# Patient Record
Sex: FEMALE | Race: WHITE | NOT HISPANIC OR LATINO | Employment: OTHER | URBAN - METROPOLITAN AREA
[De-identification: names, ages, dates, MRNs, and addresses within clinical notes are randomized per-mention and may not be internally consistent; named-entity substitution may affect disease eponyms.]

---

## 2017-08-22 ENCOUNTER — HOSPITAL ENCOUNTER (OUTPATIENT)
Dept: RADIOLOGY | Facility: MEDICAL CENTER | Age: 67
End: 2017-08-22
Attending: INTERNAL MEDICINE
Payer: MEDICARE

## 2017-08-22 DIAGNOSIS — I77.89 ENLARGEMENT OF ABDOMINAL AORTA (HCC): ICD-10-CM

## 2017-08-22 PROCEDURE — 76700 US EXAM ABDOM COMPLETE: CPT

## 2017-08-25 ENCOUNTER — HOSPITAL ENCOUNTER (OUTPATIENT)
Dept: LAB | Facility: MEDICAL CENTER | Age: 67
End: 2017-08-25
Attending: INTERNAL MEDICINE
Payer: MEDICARE

## 2017-08-25 LAB
ALBUMIN SERPL BCP-MCNC: 4.1 G/DL (ref 3.2–4.9)
ALBUMIN/GLOB SERPL: 1.4 G/DL
ALP SERPL-CCNC: 78 U/L (ref 30–99)
ALT SERPL-CCNC: 24 U/L (ref 2–50)
ANION GAP SERPL CALC-SCNC: 8 MMOL/L (ref 0–11.9)
AST SERPL-CCNC: 26 U/L (ref 12–45)
BASOPHILS # BLD AUTO: 1.5 % (ref 0–1.8)
BASOPHILS # BLD: 0.1 K/UL (ref 0–0.12)
BILIRUB SERPL-MCNC: 0.7 MG/DL (ref 0.1–1.5)
BUN SERPL-MCNC: 21 MG/DL (ref 8–22)
CALCIUM SERPL-MCNC: 9.9 MG/DL (ref 8.5–10.5)
CHLORIDE SERPL-SCNC: 105 MMOL/L (ref 96–112)
CHOLEST SERPL-MCNC: 197 MG/DL (ref 100–199)
CO2 SERPL-SCNC: 24 MMOL/L (ref 20–33)
CREAT SERPL-MCNC: 0.79 MG/DL (ref 0.5–1.4)
EOSINOPHIL # BLD AUTO: 0.11 K/UL (ref 0–0.51)
EOSINOPHIL NFR BLD: 1.6 % (ref 0–6.9)
ERYTHROCYTE [DISTWIDTH] IN BLOOD BY AUTOMATED COUNT: 46.6 FL (ref 35.9–50)
FASTING STATUS PATIENT QL REPORTED: NORMAL
GFR SERPL CREATININE-BSD FRML MDRD: >60 ML/MIN/1.73 M 2
GLOBULIN SER CALC-MCNC: 3 G/DL (ref 1.9–3.5)
GLUCOSE SERPL-MCNC: 93 MG/DL (ref 65–99)
HCT VFR BLD AUTO: 40.8 % (ref 37–47)
HDLC SERPL-MCNC: 74 MG/DL
HGB BLD-MCNC: 13.7 G/DL (ref 12–16)
IMM GRANULOCYTES # BLD AUTO: 0.02 K/UL (ref 0–0.11)
IMM GRANULOCYTES NFR BLD AUTO: 0.3 % (ref 0–0.9)
LDLC SERPL CALC-MCNC: 111 MG/DL
LYMPHOCYTES # BLD AUTO: 2.34 K/UL (ref 1–4.8)
LYMPHOCYTES NFR BLD: 34.9 % (ref 22–41)
MCH RBC QN AUTO: 31.8 PG (ref 27–33)
MCHC RBC AUTO-ENTMCNC: 33.6 G/DL (ref 33.6–35)
MCV RBC AUTO: 94.7 FL (ref 81.4–97.8)
MONOCYTES # BLD AUTO: 0.66 K/UL (ref 0–0.85)
MONOCYTES NFR BLD AUTO: 9.8 % (ref 0–13.4)
NEUTROPHILS # BLD AUTO: 3.48 K/UL (ref 2–7.15)
NEUTROPHILS NFR BLD: 51.9 % (ref 44–72)
NRBC # BLD AUTO: 0 K/UL
NRBC BLD AUTO-RTO: 0 /100 WBC
PLATELET # BLD AUTO: 225 K/UL (ref 164–446)
PMV BLD AUTO: 9.8 FL (ref 9–12.9)
POTASSIUM SERPL-SCNC: 4.3 MMOL/L (ref 3.6–5.5)
PROT SERPL-MCNC: 7.1 G/DL (ref 6–8.2)
RBC # BLD AUTO: 4.31 M/UL (ref 4.2–5.4)
SODIUM SERPL-SCNC: 137 MMOL/L (ref 135–145)
TRIGL SERPL-MCNC: 60 MG/DL (ref 0–149)
WBC # BLD AUTO: 6.7 K/UL (ref 4.8–10.8)

## 2017-08-25 PROCEDURE — 85025 COMPLETE CBC W/AUTO DIFF WBC: CPT

## 2017-08-25 PROCEDURE — 80061 LIPID PANEL: CPT | Mod: GA

## 2017-08-25 PROCEDURE — 36415 COLL VENOUS BLD VENIPUNCTURE: CPT

## 2017-08-25 PROCEDURE — 80053 COMPREHEN METABOLIC PANEL: CPT

## 2017-10-10 ENCOUNTER — HOSPITAL ENCOUNTER (OUTPATIENT)
Dept: RADIOLOGY | Facility: MEDICAL CENTER | Age: 67
End: 2017-10-10
Attending: SURGERY
Payer: MEDICARE

## 2017-10-10 DIAGNOSIS — I71.40 ABDOMINAL AORTIC ANEURYSM WITHOUT RUPTURE (HCC): ICD-10-CM

## 2017-10-10 PROCEDURE — 75635 CT ANGIO ABDOMINAL ARTERIES: CPT

## 2017-10-10 PROCEDURE — 700117 HCHG RX CONTRAST REV CODE 255: Performed by: SURGERY

## 2017-10-10 RX ADMIN — IOHEXOL 100 ML: 350 INJECTION, SOLUTION INTRAVENOUS at 12:30

## 2017-11-17 ENCOUNTER — OFFICE VISIT (OUTPATIENT)
Dept: CARDIOLOGY | Facility: MEDICAL CENTER | Age: 67
End: 2017-11-17
Payer: MEDICARE

## 2017-11-17 ENCOUNTER — TELEPHONE (OUTPATIENT)
Dept: CARDIOLOGY | Facility: MEDICAL CENTER | Age: 67
End: 2017-11-17

## 2017-11-17 VITALS
DIASTOLIC BLOOD PRESSURE: 90 MMHG | WEIGHT: 124 LBS | HEART RATE: 70 BPM | SYSTOLIC BLOOD PRESSURE: 150 MMHG | OXYGEN SATURATION: 99 %

## 2017-11-17 DIAGNOSIS — E78.5 DYSLIPIDEMIA: ICD-10-CM

## 2017-11-17 DIAGNOSIS — Z72.0 TOBACCO ABUSE: ICD-10-CM

## 2017-11-17 DIAGNOSIS — I71.60 THORACOABDOMINAL AORTIC ANEURYSM (TAAA) WITHOUT RUPTURE (HCC): ICD-10-CM

## 2017-11-17 DIAGNOSIS — I10 ESSENTIAL HYPERTENSION, BENIGN: ICD-10-CM

## 2017-11-17 LAB — EKG IMPRESSION: NORMAL

## 2017-11-17 PROCEDURE — 93000 ELECTROCARDIOGRAM COMPLETE: CPT | Performed by: INTERNAL MEDICINE

## 2017-11-17 PROCEDURE — 99204 OFFICE O/P NEW MOD 45 MIN: CPT | Performed by: INTERNAL MEDICINE

## 2017-11-17 RX ORDER — LISINOPRIL 5 MG/1
5 TABLET ORAL 2 TIMES DAILY
Qty: 60 TAB | Refills: 11 | Status: SHIPPED | OUTPATIENT
Start: 2017-11-17 | End: 2018-04-05

## 2017-11-17 RX ORDER — ATORVASTATIN CALCIUM 20 MG/1
20 TABLET, FILM COATED ORAL DAILY
Qty: 30 TAB | Refills: 11 | Status: SHIPPED | OUTPATIENT
Start: 2017-11-17 | End: 2018-04-18 | Stop reason: SDUPTHER

## 2017-11-17 RX ORDER — METOPROLOL SUCCINATE 25 MG/1
25 TABLET, EXTENDED RELEASE ORAL 2 TIMES DAILY
COMMUNITY
End: 2018-04-18 | Stop reason: SDUPTHER

## 2017-11-17 RX ORDER — NICOTINE 21 MG/24HR
1 PATCH, TRANSDERMAL 24 HOURS TRANSDERMAL EVERY 24 HOURS
COMMUNITY
End: 2018-04-05

## 2017-11-17 ASSESSMENT — ENCOUNTER SYMPTOMS
CHILLS: 0
WEAKNESS: 0
PSYCHIATRIC NEGATIVE: 1
CARDIOVASCULAR NEGATIVE: 1
EYE REDNESS: 1
FOCAL WEAKNESS: 0
BLURRED VISION: 0
BRUISES/BLEEDS EASILY: 0
SHORTNESS OF BREATH: 0
VOMITING: 0
CONSTITUTIONAL NEGATIVE: 1
NAUSEA: 0
HEADACHES: 0
DIZZINESS: 0
DEPRESSION: 0
COUGH: 0
WEIGHT LOSS: 0
ABDOMINAL PAIN: 0
CONSTIPATION: 1
MUSCULOSKELETAL NEGATIVE: 1
NERVOUS/ANXIOUS: 0
MYALGIAS: 0
RESPIRATORY NEGATIVE: 1
PALPITATIONS: 0
DOUBLE VISION: 0
CLAUDICATION: 0
FEVER: 0
NEUROLOGICAL NEGATIVE: 1

## 2017-11-17 NOTE — TELEPHONE ENCOUNTER
Patient is scheduled on 11-29-17 for a C w/poss with  at [facility]. No meds to stop. Patient was instructed to hold nicotine patch for 12hrs per Dr. Gonzales's request. Patient was told to check in at 6:00am for a 7:30 procedure. H&P was done on 11-17-17 by Dr. Gonzales. Pre-admit is scheduled on 11-28-17 at 1:45.

## 2017-11-17 NOTE — TELEPHONE ENCOUNTER
----- Message from TAVO Boyer sent at 11/16/2017  2:23 PM PST -----  Regarding: RE: KUN patient  Perfect. Thanks. I let him know that cath schedule is tight right now. SC  ----- Message -----  From: Yenny Maxwell  Sent: 11/16/2017   2:15 PM  To: TAVO Boyer  Subject: RE: KUN patient                                   I sent it to the schedulers to see if they can get patient squeezed into his schedule tomorrow 11-17-17.   ----- Message -----  From: TAVO Boyer  Sent: 11/16/2017   2:07 PM  To: Yenny Maxwell  Subject: KUN patient                                       Needs cath prior to endovascular surgery with Femi. Pre-op eval. Can we fit patient into JI schedule tomorrow for consult and then schedule cath after. Thanks, SC    ----- Message -----  From: Yenny Maxwell  Sent: 11/16/2017   1:42 PM  To: TAVO Boyer        ----- Message -----  From: Fredy Gonzales M.D.  Sent: 11/16/2017  12:34 PM  To: Yenny Maxwell    Please schedule consult and cardiac catheterization with me as requested by Nirav Mota.    Thanks.  KUN.

## 2017-11-17 NOTE — PROGRESS NOTES
Subjective:   Brie Olivier is a 67 y.o. female who presents today as a consult from Nirav Mota for cardiac catheterization for thoracoabdominal aortic aneurysm.    Thank you for allowing me to evaluate Mrs. Olivier, who as you know is a 67 year old female with thoracoabdominal aortic aneurysm. She is pending surgical repair in [place]. She is clinically doing well. She denies chest pain, shortness of breath, palpitations, nausea/vomiting or diaphoresis.    Past Medical History:   Diagnosis Date   • Thoracoabdominal aortic aneurysm (CMS-HCC)      History reviewed. No pertinent surgical history.  Family History   Problem Relation Age of Onset   • Heart Disease Neg Hx      History   Smoking Status   • Current Some Day Smoker   • Packs/day: 0.25   • Years: 40.00   • Types: Cigarettes   Smokeless Tobacco   • Never Used     Comment: 1-2 a day      No Known Allergies      Medications reviewed.    Outpatient Encounter Prescriptions as of 11/17/2017   Medication Sig Dispense Refill   • Psyllium (METAMUCIL FIBER PO) Take  by mouth.     • Loratadine (CLARITIN PO) Take  by mouth.     • metoprolol SR (TOPROL XL) 25 MG TABLET SR 24 HR Take 25 mg by mouth every day. 1/2 tab @@ bedtime     • nicotine (NICODERM CQ) 21 MG/24HR PATCH 24 HR Apply 1 Patch to skin as directed every 24 hours.       No facility-administered encounter medications on file as of 11/17/2017.      Review of Systems   Constitutional: Negative.  Negative for chills, fever, malaise/fatigue and weight loss.   HENT: Negative.  Negative for hearing loss.    Eyes: Positive for redness. Negative for blurred vision and double vision.   Respiratory: Negative.  Negative for cough and shortness of breath.    Cardiovascular: Negative.  Negative for chest pain, palpitations, claudication and leg swelling.   Gastrointestinal: Positive for constipation. Negative for abdominal pain, nausea and vomiting.   Genitourinary: Negative.  Negative for dysuria and urgency.    Musculoskeletal: Negative.  Negative for joint pain and myalgias.   Skin: Negative.  Negative for itching and rash.   Neurological: Negative.  Negative for dizziness, focal weakness, weakness and headaches.   Endo/Heme/Allergies: Positive for environmental allergies. Does not bruise/bleed easily.   Psychiatric/Behavioral: Negative.  Negative for depression. The patient is not nervous/anxious.         Objective:   /90   Pulse 70   Wt 56.2 kg (124 lb)   SpO2 99%     Physical Exam   Constitutional: She is oriented to person, place, and time. She appears well-developed and well-nourished.   HENT:   Head: Normocephalic and atraumatic.   Eyes: Conjunctivae are normal. Pupils are equal, round, and reactive to light.   Neck: Normal range of motion. Neck supple.   Cardiovascular: Normal rate and regular rhythm.    Pulmonary/Chest: Effort normal and breath sounds normal.   Abdominal: Soft. Bowel sounds are normal.   Musculoskeletal: Normal range of motion. She exhibits no edema.   Neurological: She is alert and oriented to person, place, and time.   Skin: Skin is warm and dry.   Psychiatric: She has a normal mood and affect.     CARDIAC STUDIES/PROCEDURES:    CTA OF CHEST (10/10/17)  1.  Aneurysmal dilatation of the thoracic aorta diffusely with maximum diameter just above the diaphragmatic hiatus measuring 4.6 x 4.4 cm.  2.  Aneurysmal dilatation of the ascending thoracic aorta with maximum AP diameter 3.6 cm.  3.  Aneurysmal dilatation of the abdominal aorta to the level of the infrarenal portion with maximum transverse dimensions of 4.6 x 4.4 cm.  4.  Patent mesenteric and renal arteries.  5.  No evidence of aortic dissection.    ECHOCARDIOGRAM CONCLUSIONS at [facility] (11/11/17)  Echocardiogram showing ejection fraction of 66%, mild tricuspid regurgitation.    EKG was ordered for thoracoabdominal aortic aneurysm, performed on (11/17/17) and reviewed: EKG shows sinus rhythm.    Laboratory results of  (08/25/17) were reviewed. Cholesterol profile of 197/60/74/111 noted.    ULTRASOUND ABDOMEN (08/22/17)  1.  Abdominal aortic aneurysm measuring up to 6.2 x 6.6 cm  2.  Moderate extrahepatic biliary dilatation without evidence of intrahepatic biliary dilatation. This may be due to an anatomic variation (ductal plate malformation) however a distal obstructing lesion is not excluded.    Assessment:     1. Thoracoabdominal aortic aneurysm (TAAA) without rupture (CMS-[facility])  EKG   2. Essential hypertension, benign     3. Dyslipidemia     4. Tobacco abuse       Medical Decision Making:  Today's Assessment / Status / Plan:     1. Thoracoabdominal aortic aneurysm: She is pending surgery. We will perform cardiac catheterization as requested. She understands the risks and benefits and agrees with plan.  2. Hypertension: Blood pressure has been high. We will start lisinopril 5 mg BID and reassess the blood pressure.  3. Hyperlipidemia: We will start statin therapy with atorvastatin 20 mg QHS.  4. Chronic tobacco use: Smoking cessation recommended.  5. Additional information: She is a close friend of Jack Valdivia.    The risks, benefits, and alternatives to coronary angiography with IV sedation were discussed in great detail. Specific risks mentioned include bleeding, infection, kidney damage, allergic reaction, cardiac perforation with possible tamponade requiring francheska-cardiocentesis or possible open heart surgery. In addition, we discussed that 10% of patients will experience small to moderate bruising at the side of the arterial puncture. Lastly the risks of heart attack, stroke, and death were discussed; the risks of major complications such as heart attack or stroke caused by the angiogram is less than 1%; the risk of death is approximately 1 in 1000. The patient verbalized understanding of these potential complications and wishes to proceed with this procedure.    We will follow up in two months.    Thank you for this  consult.    CC Spencer Lord

## 2017-11-28 DIAGNOSIS — Z01.812 PRE-PROCEDURAL LABORATORY EXAMINATION: ICD-10-CM

## 2017-11-28 DIAGNOSIS — Z01.810 PRE-OPERATIVE CARDIOVASCULAR EXAMINATION: ICD-10-CM

## 2017-11-28 LAB
ANION GAP SERPL CALC-SCNC: 8 MMOL/L (ref 0–11.9)
BUN SERPL-MCNC: 26 MG/DL (ref 8–22)
CALCIUM SERPL-MCNC: 9.7 MG/DL (ref 8.5–10.5)
CHLORIDE SERPL-SCNC: 105 MMOL/L (ref 96–112)
CO2 SERPL-SCNC: 26 MMOL/L (ref 20–33)
CREAT SERPL-MCNC: 0.65 MG/DL (ref 0.5–1.4)
EKG IMPRESSION: NORMAL
ERYTHROCYTE [DISTWIDTH] IN BLOOD BY AUTOMATED COUNT: 47.7 FL (ref 35.9–50)
GFR SERPL CREATININE-BSD FRML MDRD: >60 ML/MIN/1.73 M 2
GLUCOSE SERPL-MCNC: 90 MG/DL (ref 65–99)
HCT VFR BLD AUTO: 39.2 % (ref 37–47)
HGB BLD-MCNC: 13 G/DL (ref 12–16)
INR PPP: 1.03 (ref 0.87–1.13)
MCH RBC QN AUTO: 32 PG (ref 27–33)
MCHC RBC AUTO-ENTMCNC: 33.2 G/DL (ref 33.6–35)
MCV RBC AUTO: 96.6 FL (ref 81.4–97.8)
PLATELET # BLD AUTO: 244 K/UL (ref 164–446)
PMV BLD AUTO: 9.4 FL (ref 9–12.9)
POTASSIUM SERPL-SCNC: 4.1 MMOL/L (ref 3.6–5.5)
PROTHROMBIN TIME: 13.2 SEC (ref 12–14.6)
RBC # BLD AUTO: 4.06 M/UL (ref 4.2–5.4)
SODIUM SERPL-SCNC: 139 MMOL/L (ref 135–145)
WBC # BLD AUTO: 6.6 K/UL (ref 4.8–10.8)

## 2017-11-28 PROCEDURE — 93005 ELECTROCARDIOGRAM TRACING: CPT

## 2017-11-28 PROCEDURE — 36415 COLL VENOUS BLD VENIPUNCTURE: CPT

## 2017-11-28 PROCEDURE — 85610 PROTHROMBIN TIME: CPT

## 2017-11-28 PROCEDURE — 85027 COMPLETE CBC AUTOMATED: CPT

## 2017-11-28 PROCEDURE — 80048 BASIC METABOLIC PNL TOTAL CA: CPT

## 2017-11-28 PROCEDURE — 93010 ELECTROCARDIOGRAM REPORT: CPT | Performed by: INTERNAL MEDICINE

## 2017-11-29 ENCOUNTER — HOSPITAL ENCOUNTER (OUTPATIENT)
Facility: MEDICAL CENTER | Age: 67
End: 2017-11-29
Attending: INTERNAL MEDICINE | Admitting: INTERNAL MEDICINE
Payer: MEDICARE

## 2017-11-29 VITALS
DIASTOLIC BLOOD PRESSURE: 74 MMHG | SYSTOLIC BLOOD PRESSURE: 120 MMHG | HEART RATE: 54 BPM | HEIGHT: 64 IN | BODY MASS INDEX: 20.89 KG/M2 | OXYGEN SATURATION: 99 % | RESPIRATION RATE: 16 BRPM | TEMPERATURE: 97.2 F | WEIGHT: 122.36 LBS

## 2017-11-29 PROCEDURE — G0278 ILIAC ART ANGIO,CARDIAC CATH: HCPCS

## 2017-11-29 PROCEDURE — 700111 HCHG RX REV CODE 636 W/ 250 OVERRIDE (IP)

## 2017-11-29 PROCEDURE — 160002 HCHG RECOVERY MINUTES (STAT)

## 2017-11-29 PROCEDURE — 700101 HCHG RX REV CODE 250

## 2017-11-29 PROCEDURE — 93458 L HRT ARTERY/VENTRICLE ANGIO: CPT

## 2017-11-29 PROCEDURE — 99152 MOD SED SAME PHYS/QHP 5/>YRS: CPT

## 2017-11-29 PROCEDURE — C1894 INTRO/SHEATH, NON-LASER: HCPCS

## 2017-11-29 PROCEDURE — 93567 NJX CAR CTH SPRVLV AORTGRPHY: CPT

## 2017-11-29 PROCEDURE — C1769 GUIDE WIRE: HCPCS

## 2017-11-29 PROCEDURE — 307093 HCHG TR BAND RADIAL

## 2017-11-29 PROCEDURE — 99153 MOD SED SAME PHYS/QHP EA: CPT

## 2017-11-29 PROCEDURE — 360979 HCHG DIAGNOSTIC CATH

## 2017-11-29 RX ORDER — SODIUM CHLORIDE 9 MG/ML
INJECTION, SOLUTION INTRAVENOUS
Status: DISCONTINUED | OUTPATIENT
Start: 2017-11-29 | End: 2017-11-29

## 2017-11-29 RX ORDER — HEPARIN SODIUM,PORCINE 1000/ML
VIAL (ML) INJECTION
Status: DISPENSED
Start: 2017-11-29 | End: 2017-11-29

## 2017-11-29 RX ORDER — MIDAZOLAM HYDROCHLORIDE 1 MG/ML
INJECTION INTRAMUSCULAR; INTRAVENOUS
Status: DISPENSED
Start: 2017-11-29 | End: 2017-11-29

## 2017-11-29 RX ORDER — DIPHENHYDRAMINE HYDROCHLORIDE 50 MG/ML
25 INJECTION INTRAMUSCULAR; INTRAVENOUS EVERY 6 HOURS PRN
Status: DISCONTINUED | OUTPATIENT
Start: 2017-11-29 | End: 2017-11-29 | Stop reason: HOSPADM

## 2017-11-29 RX ORDER — VERAPAMIL HYDROCHLORIDE 2.5 MG/ML
INJECTION, SOLUTION INTRAVENOUS
Status: DISPENSED
Start: 2017-11-29 | End: 2017-11-29

## 2017-11-29 RX ORDER — SCOLOPAMINE TRANSDERMAL SYSTEM 1 MG/1
1 PATCH, EXTENDED RELEASE TRANSDERMAL
Status: DISCONTINUED | OUTPATIENT
Start: 2017-11-29 | End: 2017-11-29 | Stop reason: HOSPADM

## 2017-11-29 RX ORDER — HALOPERIDOL 5 MG/ML
1 INJECTION INTRAMUSCULAR EVERY 6 HOURS PRN
Status: DISCONTINUED | OUTPATIENT
Start: 2017-11-29 | End: 2017-11-29 | Stop reason: HOSPADM

## 2017-11-29 RX ORDER — DEXAMETHASONE SODIUM PHOSPHATE 4 MG/ML
4 INJECTION, SOLUTION INTRA-ARTICULAR; INTRALESIONAL; INTRAMUSCULAR; INTRAVENOUS; SOFT TISSUE
Status: DISCONTINUED | OUTPATIENT
Start: 2017-11-29 | End: 2017-11-29 | Stop reason: HOSPADM

## 2017-11-29 RX ORDER — SODIUM CHLORIDE 9 MG/ML
INJECTION, SOLUTION INTRAVENOUS CONTINUOUS
Status: ACTIVE | OUTPATIENT
Start: 2017-11-29 | End: 2017-11-29

## 2017-11-29 RX ORDER — ONDANSETRON 2 MG/ML
4 INJECTION INTRAMUSCULAR; INTRAVENOUS EVERY 4 HOURS PRN
Status: DISCONTINUED | OUTPATIENT
Start: 2017-11-29 | End: 2017-11-29 | Stop reason: HOSPADM

## 2017-11-29 RX ORDER — LIDOCAINE HYDROCHLORIDE 20 MG/ML
INJECTION, SOLUTION INFILTRATION; PERINEURAL
Status: DISPENSED
Start: 2017-11-29 | End: 2017-11-29

## 2017-11-29 RX ADMIN — SODIUM CHLORIDE: 9 INJECTION, SOLUTION INTRAVENOUS at 06:54

## 2017-11-29 ASSESSMENT — PAIN SCALES - GENERAL
PAINLEVEL_OUTOF10: 0

## 2017-11-29 NOTE — OR NURSING
0858Pt report given from cath lab RN, pt transferred over on a gurney, pt placed on monitor, B/P every 15 minutes per MD order. Right radial TR band in place, 13ml of air in band per RN report. Site clean, dry and soft. Pt was given water and a snack. Pt instructed to limit right wrist movement.     1005 2 ml of air was removed from TR band no S/S of bleeding    1020 3 ml of air was removed from TR band no S/S of bleeding    1035 3 ml of air was removed from TR band no S/S of bleeding    1050 3 ml of air was removed from TR band no S/S of bleeding    1105 3 ml of air was removed from TR band no S/S of bleeding    1204 pt given D/C instructions, pt verbalized understanding. Pt was given information post angiogram care. Pt going home with family in wheelchair.

## 2017-11-29 NOTE — PROGRESS NOTES
Medications pulled under patient's csn # from 11/28/17.  All medications were used including 2mg versed and 100mg fentanyl.  Unable to chart meds.  See Cath Report.

## 2017-11-29 NOTE — DISCHARGE INSTRUCTIONS
ACTIVITY: Rest and take it easy for the first 24 hours.  A responsible adult is recommended to remain with you during that time.  It is normal to feel sleepy.  We encourage you to not do anything that requires balance, judgment or coordination.    MILD FLU-LIKE SYMPTOMS ARE NORMAL. YOU MAY EXPERIENCE GENERALIZED MUSCLE ACHES, THROAT IRRITATION, HEADACHE AND/OR SOME NAUSEA.    FOR 24 HOURS DO NOT:  Drive, operate machinery or run household appliances.  Drink beer or alcoholic beverages.   Make important decisions or sign legal documents.    SPECIAL INSTRUCTIONS: keep incision dry for 24 hours    DIET: To avoid nausea, slowly advance diet as tolerated, avoiding spicy or greasy foods for the first day.  Add more substantial food to your diet according to your physician's instructions.  Babies can be fed formula or breast milk as soon as they are hungry.  INCREASE FLUIDS AND FIBER TO AVOID CONSTIPATION.    SURGICAL DRESSING/BATHING: do not shower for 24 hours, may shower to matt 11/30/2017 after 0900am    FOLLOW-UP APPOINTMENT:  A follow-up appointment should be arranged with your doctor Janet at [phone]; call to schedule.    You should CALL YOUR PHYSICIAN if you develop:  Fever greater than 101 degrees F.  Pain not relieved by medication, or persistent nausea or vomiting.  Excessive bleeding (blood soaking through dressing) or unexpected drainage from the wound.  Extreme redness or swelling around the incision site, drainage of pus or foul smelling drainage.  Inability to urinate or empty your bladder within 8 hours.  Problems with breathing or chest pain.    You should call 911 if you develop problems with breathing or chest pain.  If you are unable to contact your doctor or surgical center, you should go to the nearest emergency room or urgent care center.  Physician's telephone #: [phone]    If any questions arise, call your doctor.  If your doctor is not available, please feel free to call the  Surgical Center at [phone].  The Center is open Monday through Friday from 7AM to 7PM.  You can also call the HEALTH HOTLINE open 24 hours/day, 7 days/week and speak to a nurse at [phone], or toll free at [phone].    A registered nurse may call you a few days after your surgery to see how you are doing after your procedure.    MEDICATIONS: Resume taking daily medication.  Take prescribed pain medication with food.  If no medication is prescribed, you may take non-aspirin pain medication if needed.  PAIN MEDICATION CAN BE VERY CONSTIPATING.  Take a stool softener or laxative such as senokot, pericolace, or milk of magnesia if needed.    If your physician has prescribed pain medication that includes Acetaminophen (Tylenol), do not take additional Acetaminophen (Tylenol) while taking the prescribed medication.    Depression / Suicide Risk    As you are discharged from this [facility] facility, it is important to learn how to keep safe from harming yourself.    Recognize the warning signs:  · Abrupt changes in personality, positive or negative- including increase in energy   · Giving away possessions  · Change in eating patterns- significant weight changes-  positive or negative  · Change in sleeping patterns- unable to sleep or sleeping all the time   · Unwillingness or inability to communicate  · Depression  · Unusual sadness, discouragement and loneliness  · Talk of wanting to die  · Neglect of personal appearance   · Rebelliousness- reckless behavior  · Withdrawal from people/activities they love  · Confusion- inability to concentrate     If you or a loved one observes any of these behaviors or has concerns about self-harm, here's what you can do:  · Talk about it- your feelings and reasons for harming yourself  · Remove any means that you might use to hurt yourself (examples: pills, rope, extension cords, firearm)  · Get professional help from the community (Mental Health, Substance Abuse,  psychological counseling)  · Do not be alone:Call your Safe Contact- someone whom you trust who will be there for you.  · Call your local CRISIS HOTLINE 240-0200 or 099-731-4471  · Call your local Children's Mobile Crisis Response Team [place] (443) 793-7503 or www.[employer]iki[employer].ru  · Call the toll free National Suicide Prevention Hotlines   · National Suicide Prevention Lifeline 102-542-SXSI (8373)  · [place] [facility] Line Network 800-SUICIDE (308-4704)    Radial Catherization Discharge Instructions      · Do not subject hand/arm to any forceful movements for 24 hours    i.e. supporting weight when rising from the chair or bed.   · Do not drive a car for 24 hours  · You may remove the dressing tomorrow  · You may shower on the day following your procedure.  Do not take a tub bath or submerge the puncture site in water for 3 days following the procedure.  · No Lifting more than 3-5 pounds with affected wrist for 5 days  · Follow up with Dr. Gonzales  2-4 weeks.  · Increase fluids for 2 days post procedure.  · Continue all previous medications unless otherwise instructed.    If bleeding should occur following discharge:  · Sit down and apply firm pressure to site with your fingers for 10 minutes  · If the bleeding stops, continue to sit quietly, keeping your wrist straight for 2 hours.  Notify physician as soon as possible ( [phone])  · If bleeding does not stop after 10 minutes, or if there is a large amount of bleeding or spurting, call 911 immediately.  Do not drive yourself to the hospital.

## 2017-12-13 ENCOUNTER — HOSPITAL ENCOUNTER (OUTPATIENT)
Dept: OTHER | Facility: MEDICAL CENTER | Age: 67
End: 2017-12-13
Attending: INTERNAL MEDICINE
Payer: MEDICARE

## 2017-12-13 PROCEDURE — 94729 DIFFUSING CAPACITY: CPT | Mod: 26 | Performed by: INTERNAL MEDICINE

## 2017-12-13 PROCEDURE — 94060 EVALUATION OF WHEEZING: CPT

## 2017-12-13 PROCEDURE — 94729 DIFFUSING CAPACITY: CPT

## 2017-12-13 PROCEDURE — 94726 PLETHYSMOGRAPHY LUNG VOLUMES: CPT | Mod: 26 | Performed by: INTERNAL MEDICINE

## 2017-12-13 PROCEDURE — 94060 EVALUATION OF WHEEZING: CPT | Mod: 26 | Performed by: INTERNAL MEDICINE

## 2017-12-13 PROCEDURE — 94726 PLETHYSMOGRAPHY LUNG VOLUMES: CPT

## 2017-12-13 ASSESSMENT — PULMONARY FUNCTION TESTS
FEV1_PERCENT_CHANGE: 0
FVC_PERCENT_PREDICTED: 100
FEV1_PERCENT_PREDICTED: 87
FEV1/FVC: 68.14
FEV1_PERCENT_CHANGE: 0
FVC: 2.95
FEV1/FVC_PERCENT_PREDICTED: 87
FEV1/FVC: 68
FEV1: 2.01
FVC_PERCENT_PREDICTED: 100
FEV1/FVC_PERCENT_PREDICTED: 87
FEV1/FVC_PERCENT_CHANGE: 0
FVC: 2.95
FEV1/FVC_PERCENT_PREDICTED: 78
FVC_PREDICTED: 2.94
FEV1_PREDICTED: 2.29
FEV1: 2.01
FEV1_PERCENT_PREDICTED: 87

## 2017-12-17 NOTE — PROCEDURES
DATE OF TEST:  12/13/2017    TECHNICIAN NOTE:  The patient had good effort and cooperation.  The results of   the test meet the ATS standards for acceptability and repeatability.    SPIROMETRY:  1.  FVC was 2.95 liters, 100% of predicted.  2.  FEV1 was 2.01 liters, 87% of predicted.  3.  FEV1/FVC ratio was 68%.  4.  There was no significant response to bronchodilators.    LUNG VOLUMES:  1.  Total lung capacity was 5.75 liters, 113% of predicted.  2.  Residual volume was 3.06 liters, 143% of predicted.    Diffusion capacity was normal at 93% of predicted.    IMPRESSION:  The patient has mild obstructive ventilatory defect with FEV1 of   87% of predicted and FEV1/FVC ratio mildly decreased to 68%.  The patient also   has moderate air trapping.  These findings are consistent with the patient's   listed diagnosis of COPD.  Clinical correlation is required.       ____________________________________     MD ESTER Wilson / REANNA    DD:  12/17/2017 12:15:37  DT:  12/17/2017 13:04:13    D#:  [id number]  Job#:  [id number]

## 2018-02-27 ENCOUNTER — TELEPHONE (OUTPATIENT)
Dept: CARDIOLOGY | Facility: MEDICAL CENTER | Age: 68
End: 2018-02-27

## 2018-02-27 NOTE — TELEPHONE ENCOUNTER
Dr. Galvez's office calling for surgical clearance   Received: Today   Message Contents   BAILEE Hirsch/Eleanor Galvez's office is calling about surgical clearance. Please call [phone].        TAAA Repair with Dr. Garrison Galvez, in [facility] scheduled for 3/6/18    Office is requesting surgical clearance. Per MAR no blood thinners.      When complete, Fax # [phone] Attn: Yessenia

## 2018-02-27 NOTE — LETTER
PROCEDURE/SURGERY CLEARANCE FORM      Encounter Date: 2/27/2018    Patient: Brie Olivier  YOB: 1950    CARDIOLOGIST:  Fredy Gonzales MD/ Harleen ANGULO    REFERRING DOCTOR:  Garrison Galvez MD        The above patient is cleared to have the following procedure/surgery: TAAA Repair                                            Additional comments: This patient only saw our cardiology group for left heart cath procedure, which was clean. This procedure was done for clearance for AAA repair, which was supposed to be evaluated by vascular surgeon, Dr. Kahn but it looks [place] now [place]. Nonetheless, she is cleared for procedure.                    Provider Signature   Harleen ANGULO

## 2018-02-28 NOTE — TELEPHONE ENCOUNTER
Doctor's office returning call   Received: Today   Message Contents   BAILEE Gong/Frank Douglass at Dr Galvez's office at [phone] is returning Eleanor's call from yesterday.      Called Evonne and no answer. LM with details on Evonne's voicemail. Also faxing clearance letter to [phone]

## 2018-02-28 NOTE — TELEPHONE ENCOUNTER
VENECIA Boyer.   You 15 hours ago (4:56 PM)     Did JI write you about this patient? She is only seeing cardiology for [facility] procedure, which was clean. This procedure was done for clearance for AAA repair, which was supposed to be evaluated by Femi but it looks like she is now [place] of [place]. Nonetheless, she is cleared for procedure. SC (Routing comment)

## 2018-03-28 ENCOUNTER — TELEPHONE (OUTPATIENT)
Dept: CARDIOLOGY | Facility: MEDICAL CENTER | Age: 68
End: 2018-03-28

## 2018-03-28 NOTE — TELEPHONE ENCOUNTER
Pt's  brought in records from pts recent hospitalization from 3/07 to 03/20 at [facility].  Discharge summary recommends cardiology f/u in 1 week. Pt scheduled for 4/5 to see JI. Records sent to scan.

## 2018-04-05 ENCOUNTER — OFFICE VISIT (OUTPATIENT)
Dept: CARDIOLOGY | Facility: MEDICAL CENTER | Age: 68
End: 2018-04-05
Payer: MEDICARE

## 2018-04-05 VITALS
DIASTOLIC BLOOD PRESSURE: 50 MMHG | BODY MASS INDEX: 19.46 KG/M2 | WEIGHT: 114 LBS | SYSTOLIC BLOOD PRESSURE: 104 MMHG | OXYGEN SATURATION: 98 % | HEIGHT: 64 IN | HEART RATE: 65 BPM

## 2018-04-05 DIAGNOSIS — E78.5 DYSLIPIDEMIA: ICD-10-CM

## 2018-04-05 DIAGNOSIS — I10 ESSENTIAL HYPERTENSION, BENIGN: ICD-10-CM

## 2018-04-05 DIAGNOSIS — I71.60 THORACOABDOMINAL AORTIC ANEURYSM (TAAA) WITHOUT RUPTURE (HCC): ICD-10-CM

## 2018-04-05 PROCEDURE — 99213 OFFICE O/P EST LOW 20 MIN: CPT | Performed by: INTERNAL MEDICINE

## 2018-04-05 RX ORDER — AMLODIPINE BESYLATE 5 MG/1
5 TABLET ORAL 2 TIMES DAILY
COMMUNITY
End: 2019-07-02

## 2018-04-05 RX ORDER — CYCLOBENZAPRINE HCL 5 MG
5-10 TABLET ORAL 3 TIMES DAILY PRN
COMMUNITY
End: 2019-07-02

## 2018-04-05 ASSESSMENT — ENCOUNTER SYMPTOMS
LOSS OF CONSCIOUSNESS: 0
ORTHOPNEA: 0
PALPITATIONS: 0
INSOMNIA: 0
BLURRED VISION: 0
FEVER: 0
ABDOMINAL PAIN: 0
CHILLS: 0
SHORTNESS OF BREATH: 0
DIZZINESS: 0
MYALGIAS: 0
PND: 0

## 2018-04-05 NOTE — PROGRESS NOTES
Chief Complaint   Patient presents with   • Follow-Up     Thoracoabdominal aortic aneurysm       Subjective:   Brie Olivier is a 67 y.o. female who presents today for hospital follow up.    Since the discharge from [facility] on 11/29/17 status post cardiac catheterization, she has been doing well. She admits to discomfort from her large incision site of her left chest which is healing well and fatigue. She denies chest pain, shortness of breath, palpitations, nausea/vomiting or diaphoresis.    Past Medical History:   Diagnosis Date   • Pneumonia 2015   • Thoracoabdominal aortic aneurysm (CMS-HCC)      History reviewed. No pertinent surgical history.  Family History   Problem Relation Age of Onset   • Heart Disease Neg Hx      Social History     Social History   • Marital status:      Spouse name: N/A   • Number of children: N/A   • Years of education: N/A     Occupational History   • Not on file.     Social History Main Topics   • Smoking status: Former Smoker     Packs/day: 0.25     Years: 40.00     Types: Cigarettes     Quit date: 3/3/2018   • Smokeless tobacco: Never Used      Comment: 1-2 a day    • Alcohol use 4.2 oz/week     7 Glasses of wine per week      Comment: 1 per day   • Drug use: No   • Sexual activity: Not on file     Other Topics Concern   • Not on file     Social History Narrative   • No narrative on file     No Known Allergies     Medications reviewed.    Outpatient Encounter Prescriptions as of 4/5/2018   Medication Sig Dispense Refill   • amLODIPine (NORVASC) 5 MG Tab Take 5 mg by mouth 2 Times a Day.     • aspirin 81 MG tablet Take 81 mg by mouth every day.     • cyclobenzaprine (FLEXERIL) 5 MG tablet Take 5-10 mg by mouth 3 times a day as needed.     • Melatonin 5 MG Cap Take  by mouth.     • Psyllium (METAMUCIL FIBER PO) Take  by mouth.     • metoprolol SR (TOPROL XL) 25 MG TABLET SR 24 HR Take 25 mg by mouth 2 Times a Day.     • atorvastatin (LIPITOR) 20 MG Tab  "Take 1 Tab by mouth every day. 30 Tab 11   • [DISCONTINUED] Loratadine (CLARITIN PO) Take  by mouth.     • [DISCONTINUED] nicotine (NICODERM CQ) 21 MG/24HR PATCH 24 HR Apply 1 Patch to skin as directed every 24 hours.     • [DISCONTINUED] lisinopril (PRINIVIL) 5 MG Tab Take 1 Tab by mouth 2 times a day. 60 Tab 11     No facility-administered encounter medications on file as of 4/5/2018.      Review of Systems   Constitutional: Positive for malaise/fatigue. Negative for chills and fever.   HENT: Negative for congestion.    Eyes: Negative for blurred vision.   Respiratory: Negative for shortness of breath.    Cardiovascular: Negative for chest pain, palpitations, orthopnea, leg swelling and PND.   Gastrointestinal: Negative for abdominal pain.   Genitourinary: Negative for dysuria.   Musculoskeletal: Negative for joint pain and myalgias.        Chest wall incisional discomfort.   Skin: Negative for rash.   Neurological: Negative for dizziness and loss of consciousness.   Psychiatric/Behavioral: The patient does not have insomnia.         Objective:   /50   Pulse 65   Ht 1.626 m (5' 4\")   Wt 51.7 kg (114 lb)   SpO2 98%   BMI 19.57 kg/m²     Physical Exam   Constitutional: She is oriented to person, place, and time. She appears well-developed and well-nourished.   HENT:   Head: Normocephalic and atraumatic.   Eyes: Conjunctivae are normal. Pupils are equal, round, and reactive to light.   Neck: Normal range of motion. Neck supple.   Cardiovascular: Normal rate and regular rhythm.    Pulmonary/Chest: Effort normal and breath sounds normal.   Abdominal: Soft. Bowel sounds are normal.   Musculoskeletal: Normal range of motion.   Neurological: She is alert and oriented to person, place, and time.   Skin: Skin is warm and dry.   Psychiatric: She has a normal mood and affect.        CARDIAC STUDIES/PROCEDURES:    CARDIAC CATHETERIZATION CONCLUSIONS (11/29/17)  1.  No angiographic evidence of coronary artery " disease.  2.  Normal left ventricular systolic function with ejection fraction of 65%.  3.  Mildly elevated left ventricular end-diastolic pressure.  4.  Moderately enlarged ascending aorta.  5.  Large descending aortic aneurysm originating just beyond the aortic arch   and continuing into the abdominal aorta.     CTA OF CHEST (10/10/17)  1.  Aneurysmal dilatation of the thoracic aorta diffusely with maximum diameter just above the diaphragmatic hiatus measuring 4.6 x 4.4 cm.  2.  Aneurysmal dilatation of the ascending thoracic aorta with maximum AP diameter 3.6 cm.  3.  Aneurysmal dilatation of the abdominal aorta to the level of the infrarenal portion with maximum transverse dimensions of 4.6 x 4.4 cm.  4.  Patent mesenteric and renal arteries.  5.  No evidence of aortic dissection.     ECHOCARDIOGRAM CONCLUSIONS at [facility] (11/11/17)  Echocardiogram showing ejection fraction of 66%, mild tricuspid regurgitation.     EKG performed on (11/28/17) was reviewed: EKG shows sinus rhythm with left ventricular hypertrophy.  EKG performed on (11/17/17) EKG shows sinus rhythm.     Laboratory results of (08/25/17) were reviewed. Cholesterol profile of 197/60/74/111 noted.    PULMONARY FUNCTION INTERPRETATION (12/13/17)  IMPRESSION:  The patient has mild obstructive ventilatory defect with FEV1 of   87% of predicted and FEV1/FVC ratio mildly decreased to 68%.  The patient also   has moderate air trapping.  These findings are consistent with the patient's   listed diagnosis of COPD.  Clinical correlation is required.      ULTRASOUND ABDOMEN (08/22/17)  1.  Abdominal aortic aneurysm measuring up to 6.2 x 6.6 cm  2.  Moderate extrahepatic biliary dilatation without evidence of intrahepatic biliary dilatation. This may be due to an anatomic variation (ductal plate malformation) however a distal obstructing lesion is not excluded.     Assessment:     1. Thoracoabdominal aortic aneurysm (TAAA) without rupture (CMS-[facility])      2. Essential hypertension, benign     3. Dyslipidemia       Medical Decision Making:  Today's Assessment / Status / Plan:     1. Thoracoabdominal aortic aneurysm with surgical repair [facility] in [place] (03/07/18): She is clinically doing well.  2. Hypertension: Blood pressure has been low.  We will hold her amlodipine and reassess.  3. Hyperlipidemia: She is doing well on statin therapy without myalgia symptoms. We will repeat labs including fasting lipid profile.  4. Chronic tobacco use: Smoking cessation recommended.  5. Additional information: She is a close friend of Jack Valdivia.    We will follow up the patient in one year.    CC Nirav Mota and Spencer Lord

## 2018-04-05 NOTE — LETTER
[facility]   [address], [address]  [place] [address]  Phone: [phone]  Fax: [phone]              Brie Olivier  1950    Encounter Date: 4/5/2018    Fredy Gonzales M.D.          PROGRESS NOTE:  Chief Complaint   Patient presents with   • Follow-Up     Thoracoabdominal aortic aneurysm       Subjective:   Brie Olivier is a 67 y.o. female who presents today for hospital follow up.    Since the discharge from [facility] on 11/29/17 status post cardiac catheterization, she has been doing well.    Past Medical History:   Diagnosis Date   • Pneumonia 2015   • Thoracoabdominal aortic aneurysm (CMS-HCC)      History reviewed. No pertinent surgical history.  Family History   Problem Relation Age of Onset   • Heart Disease Neg Hx      Social History     Social History   • Marital status:      Spouse name: N/A   • Number of children: N/A   • Years of education: N/A     Occupational History   • Not on file.     Social History Main Topics   • Smoking status: Former Smoker     Packs/day: 0.25     Years: 40.00     Types: Cigarettes     Quit date: 3/3/2018   • Smokeless tobacco: Never Used      Comment: 1-2 a day    • Alcohol use 4.2 oz/week     7 Glasses of wine per week      Comment: 1 per day   • Drug use: No   • Sexual activity: Not on file     Other Topics Concern   • Not on file     Social History Narrative   • No narrative on file     No Known Allergies     Medications reviewed.    Outpatient Encounter Prescriptions as of 4/5/2018   Medication Sig Dispense Refill   • amLODIPine (NORVASC) 5 MG Tab Take 5 mg by mouth 2 Times a Day.     • aspirin 81 MG tablet Take 81 mg by mouth every day.     • cyclobenzaprine (FLEXERIL) 5 MG tablet Take 5-10 mg by mouth 3 times a day as needed.     • Melatonin 5 MG Cap Take  by mouth.     • Psyllium (METAMUCIL FIBER PO) Take  by mouth.     • metoprolol SR (TOPROL XL) 25 MG TABLET SR 24 HR  "Take 25 mg by mouth 2 Times a Day.     • atorvastatin (LIPITOR) 20 MG Tab Take 1 Tab by mouth every day. 30 Tab 11   • [DISCONTINUED] Loratadine (CLARITIN PO) Take  by mouth.     • [DISCONTINUED] nicotine (NICODERM CQ) 21 MG/24HR PATCH 24 HR Apply 1 Patch to skin as directed every 24 hours.     • [DISCONTINUED] lisinopril (PRINIVIL) 5 MG Tab Take 1 Tab by mouth 2 times a day. 60 Tab 11     No facility-administered encounter medications on file as of 4/5/2018.      Review of Systems   Constitutional: Negative for chills and fever.   HENT: Negative for congestion.    Eyes: Negative for blurred vision.   Respiratory: Negative for shortness of breath.    Cardiovascular: Negative for chest pain, palpitations, orthopnea, leg swelling and PND.   Gastrointestinal: Negative for abdominal pain.   Genitourinary: Negative for dysuria.   Musculoskeletal: Negative for joint pain and myalgias.   Skin: Negative for rash.   Neurological: Negative for dizziness and loss of consciousness.   Psychiatric/Behavioral: The patient does not have insomnia.         Objective:   /50   Pulse 65   Ht 1.626 m (5' 4\")   Wt 51.7 kg (114 lb)   SpO2 98%   BMI 19.57 kg/m²      Physical Exam   Constitutional: She is oriented to person, place, and time. She appears well-developed and well-nourished.   HENT:   Head: Normocephalic and atraumatic.   Eyes: Conjunctivae are normal. Pupils are equal, round, and reactive to light.   Neck: Normal range of motion. Neck supple.   Cardiovascular: Normal rate and regular rhythm.    Pulmonary/Chest: Effort normal and breath sounds normal.   Abdominal: Soft. Bowel sounds are normal.   Musculoskeletal: Normal range of motion.   Neurological: She is alert and oriented to person, place, and time.   Skin: Skin is warm and dry.   Psychiatric: She has a normal mood and affect.        CARDIAC STUDIES/PROCEDURES:    CARDIAC CATHETERIZATION CONCLUSIONS (11/29/17)  1.  No angiographic evidence of coronary artery " disease.  2.  Normal left ventricular systolic function with ejection fraction of 65%.  3.  Mildly elevated left ventricular end-diastolic pressure.  4.  Moderately enlarged ascending aorta.  5.  Large descending aortic aneurysm originating just beyond the aortic arch   and continuing into the abdominal aorta.     CTA OF CHEST (10/10/17)  1.  Aneurysmal dilatation of the thoracic aorta diffusely with maximum diameter just above the diaphragmatic hiatus measuring 4.6 x 4.4 cm.  2.  Aneurysmal dilatation of the ascending thoracic aorta with maximum AP diameter 3.6 cm.  3.  Aneurysmal dilatation of the abdominal aorta to the level of the infrarenal portion with maximum transverse dimensions of 4.6 x 4.4 cm.  4.  Patent mesenteric and renal arteries.  5.  No evidence of aortic dissection.     ECHOCARDIOGRAM CONCLUSIONS at [facility] (11/11/17)  Echocardiogram showing ejection fraction of 66%, mild tricuspid regurgitation.     EKG was ordered for thoracoabdominal aortic aneurysm, performed on (11/17/17) and reviewed: EKG shows sinus rhythm.     Laboratory results of (08/25/17) were reviewed. Cholesterol profile of 197/60/74/111 noted.     ULTRASOUND ABDOMEN (08/22/17)  1.  Abdominal aortic aneurysm measuring up to 6.2 x 6.6 cm  2.  Moderate extrahepatic biliary dilatation without evidence of intrahepatic biliary dilatation. This may be due to an anatomic variation (ductal plate malformation) however a distal obstructing lesion is not excluded.       Assessment:     1. 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 Essential hypertension, benign     3. Dyslipidemia         Medical Decision Making:  Today's Assessment / Status / Plan:     1. Thoracoabdominal aortic aneurysm: She is pending surgery. We will perform cardiac catheterization as requested. She understands the risks and benefits and agrees with plan.  2. Hypertension: Blood pressure has been high. We will start lisinopril 5 mg  BID and reassess the blood pressure.  3. Hyperlipidemia: We will start statin therapy with atorvastatin 20 mg QHS.  4. Chronic tobacco use: Smoking cessation recommended.  5. Additional information: She is a close friend of Jack Valdivia.    CC Nirav Mota and Spencer Lord Recipients

## 2018-04-13 ENCOUNTER — HOSPITAL ENCOUNTER (OUTPATIENT)
Dept: LAB | Facility: MEDICAL CENTER | Age: 68
End: 2018-04-13
Attending: INTERNAL MEDICINE
Payer: MEDICARE

## 2018-04-13 DIAGNOSIS — E78.5 DYSLIPIDEMIA: ICD-10-CM

## 2018-04-13 LAB
ALBUMIN SERPL BCP-MCNC: 3.5 G/DL (ref 3.2–4.9)
ALBUMIN/GLOB SERPL: 0.9 G/DL
ALP SERPL-CCNC: 83 U/L (ref 30–99)
ALT SERPL-CCNC: 14 U/L (ref 2–50)
ANION GAP SERPL CALC-SCNC: 7 MMOL/L (ref 0–11.9)
AST SERPL-CCNC: 16 U/L (ref 12–45)
BILIRUB SERPL-MCNC: 0.5 MG/DL (ref 0.1–1.5)
BUN SERPL-MCNC: 23 MG/DL (ref 8–22)
CALCIUM SERPL-MCNC: 9.3 MG/DL (ref 8.5–10.5)
CHLORIDE SERPL-SCNC: 107 MMOL/L (ref 96–112)
CHOLEST SERPL-MCNC: 129 MG/DL (ref 100–199)
CO2 SERPL-SCNC: 24 MMOL/L (ref 20–33)
CREAT SERPL-MCNC: 0.76 MG/DL (ref 0.5–1.4)
GLOBULIN SER CALC-MCNC: 3.7 G/DL (ref 1.9–3.5)
GLUCOSE SERPL-MCNC: 98 MG/DL (ref 65–99)
HDLC SERPL-MCNC: 33 MG/DL
LDLC SERPL CALC-MCNC: 79 MG/DL
POTASSIUM SERPL-SCNC: 4.5 MMOL/L (ref 3.6–5.5)
PROT SERPL-MCNC: 7.2 G/DL (ref 6–8.2)
SODIUM SERPL-SCNC: 138 MMOL/L (ref 135–145)
TRIGL SERPL-MCNC: 86 MG/DL (ref 0–149)

## 2018-04-13 PROCEDURE — 36415 COLL VENOUS BLD VENIPUNCTURE: CPT

## 2018-04-13 PROCEDURE — 80061 LIPID PANEL: CPT

## 2018-04-13 PROCEDURE — 80053 COMPREHEN METABOLIC PANEL: CPT

## 2018-04-16 ENCOUNTER — TELEPHONE (OUTPATIENT)
Dept: CARDIOLOGY | Facility: MEDICAL CENTER | Age: 68
End: 2018-04-16

## 2018-04-17 ENCOUNTER — HOSPITAL ENCOUNTER (OUTPATIENT)
Dept: RADIOLOGY | Facility: MEDICAL CENTER | Age: 68
End: 2018-04-17
Attending: SURGERY
Payer: MEDICARE

## 2018-04-17 DIAGNOSIS — I71.60 THORACOABDOMINAL AORTIC ANEURYSM (TAAA) WITHOUT RUPTURE (HCC): ICD-10-CM

## 2018-04-17 DIAGNOSIS — R09.02 HYPOXIA: ICD-10-CM

## 2018-04-17 PROCEDURE — 71275 CT ANGIOGRAPHY CHEST: CPT

## 2018-04-17 PROCEDURE — 700117 HCHG RX CONTRAST REV CODE 255: Performed by: SURGERY

## 2018-04-17 RX ADMIN — IOHEXOL 75 ML: 350 INJECTION, SOLUTION INTRAVENOUS at 18:10

## 2018-04-18 ENCOUNTER — TELEPHONE (OUTPATIENT)
Dept: CARDIOLOGY | Facility: MEDICAL CENTER | Age: 68
End: 2018-04-18

## 2018-04-18 DIAGNOSIS — I10 ESSENTIAL HYPERTENSION: ICD-10-CM

## 2018-04-18 DIAGNOSIS — E78.5 DYSLIPIDEMIA: ICD-10-CM

## 2018-04-18 RX ORDER — ATORVASTATIN CALCIUM 20 MG/1
20 TABLET, FILM COATED ORAL DAILY
Qty: 90 TAB | Refills: 3 | OUTPATIENT
Start: 2018-04-18 | End: 2019-04-28 | Stop reason: SDUPTHER

## 2018-04-18 RX ORDER — METOPROLOL SUCCINATE 25 MG/1
25 TABLET, EXTENDED RELEASE ORAL DAILY
Qty: 90 TAB | Refills: 3 | OUTPATIENT
Start: 2018-04-18 | End: 2018-04-18 | Stop reason: SDUPTHER

## 2018-04-18 RX ORDER — METOPROLOL SUCCINATE 25 MG/1
25 TABLET, EXTENDED RELEASE ORAL 2 TIMES DAILY
Qty: 180 TAB | Refills: 3 | OUTPATIENT
Start: 2018-04-18 | End: 2019-05-23 | Stop reason: SDUPTHER

## 2018-04-18 NOTE — TELEPHONE ENCOUNTER
Called pt to clarify metoprolol dosage for refill request. Pt confirms that she is taking 25mg metoprolol succinate, one tablet BID. Pt explains that she had surgery in [place] and this dosage was prescribed to her. She said Dr. Gonzales told her to continue this at her last appt 4/05/18. Prescription called into pharmacy.

## 2018-09-24 ENCOUNTER — HOSPITAL ENCOUNTER (OUTPATIENT)
Dept: LAB | Facility: MEDICAL CENTER | Age: 68
End: 2018-09-24
Attending: SURGERY
Payer: MEDICARE

## 2018-09-24 PROCEDURE — 36415 COLL VENOUS BLD VENIPUNCTURE: CPT

## 2018-09-24 PROCEDURE — 82565 ASSAY OF CREATININE: CPT

## 2018-09-24 PROCEDURE — 84520 ASSAY OF UREA NITROGEN: CPT

## 2018-09-25 LAB
BUN SERPL-MCNC: 28 MG/DL (ref 8–22)
CREAT SERPL-MCNC: 0.86 MG/DL (ref 0.5–1.4)

## 2018-09-28 ENCOUNTER — APPOINTMENT (OUTPATIENT)
Dept: RADIOLOGY | Facility: MEDICAL CENTER | Age: 68
End: 2018-09-28
Attending: SURGERY
Payer: MEDICARE

## 2018-09-28 DIAGNOSIS — I71.60 THORACOABDOMINAL AORTIC ANEURYSM (TAAA) WITHOUT RUPTURE (HCC): ICD-10-CM

## 2018-09-28 PROCEDURE — 71275 CT ANGIOGRAPHY CHEST: CPT

## 2018-09-28 PROCEDURE — 700117 HCHG RX CONTRAST REV CODE 255: Performed by: SURGERY

## 2018-09-28 RX ADMIN — IOHEXOL 100 ML: 350 INJECTION, SOLUTION INTRAVENOUS at 14:58

## 2019-04-28 DIAGNOSIS — E78.5 DYSLIPIDEMIA: ICD-10-CM

## 2019-04-29 RX ORDER — ATORVASTATIN CALCIUM 20 MG/1
TABLET, FILM COATED ORAL
Qty: 90 TAB | Refills: 0 | Status: SHIPPED | OUTPATIENT
Start: 2019-04-29 | End: 2019-07-02 | Stop reason: SDUPTHER

## 2019-05-23 DIAGNOSIS — I10 ESSENTIAL HYPERTENSION: ICD-10-CM

## 2019-05-23 RX ORDER — METOPROLOL SUCCINATE 25 MG/1
25 TABLET, EXTENDED RELEASE ORAL 2 TIMES DAILY
Qty: 180 TAB | Refills: 0 | Status: SHIPPED | OUTPATIENT
Start: 2019-05-23 | End: 2019-07-02 | Stop reason: SDUPTHER

## 2019-07-02 ENCOUNTER — OFFICE VISIT (OUTPATIENT)
Dept: CARDIOLOGY | Facility: MEDICAL CENTER | Age: 69
End: 2019-07-02
Payer: MEDICARE

## 2019-07-02 VITALS
WEIGHT: 131.28 LBS | BODY MASS INDEX: 22.41 KG/M2 | SYSTOLIC BLOOD PRESSURE: 130 MMHG | HEIGHT: 64 IN | OXYGEN SATURATION: 99 % | HEART RATE: 60 BPM | DIASTOLIC BLOOD PRESSURE: 88 MMHG

## 2019-07-02 DIAGNOSIS — Z72.0 TOBACCO ABUSE: ICD-10-CM

## 2019-07-02 DIAGNOSIS — E78.5 DYSLIPIDEMIA: ICD-10-CM

## 2019-07-02 DIAGNOSIS — I10 ESSENTIAL HYPERTENSION, BENIGN: ICD-10-CM

## 2019-07-02 DIAGNOSIS — I71.60 THORACOABDOMINAL AORTIC ANEURYSM (TAAA) WITHOUT RUPTURE (HCC): ICD-10-CM

## 2019-07-02 DIAGNOSIS — I10 ESSENTIAL HYPERTENSION: ICD-10-CM

## 2019-07-02 PROCEDURE — 99214 OFFICE O/P EST MOD 30 MIN: CPT | Performed by: NURSE PRACTITIONER

## 2019-07-02 RX ORDER — ATORVASTATIN CALCIUM 20 MG/1
20 TABLET, FILM COATED ORAL EVERY EVENING
Qty: 90 TAB | Refills: 3 | Status: SHIPPED | OUTPATIENT
Start: 2019-07-02 | End: 2020-06-30

## 2019-07-02 RX ORDER — METOPROLOL SUCCINATE 25 MG/1
25 TABLET, EXTENDED RELEASE ORAL 2 TIMES DAILY
Qty: 180 TAB | Refills: 3 | Status: SHIPPED | OUTPATIENT
Start: 2019-07-02 | End: 2020-07-27

## 2019-07-02 ASSESSMENT — ENCOUNTER SYMPTOMS
DIZZINESS: 0
PND: 0
PALPITATIONS: 0
ABDOMINAL PAIN: 0
CLAUDICATION: 0
SHORTNESS OF BREATH: 0
COUGH: 0
MYALGIAS: 0
ORTHOPNEA: 0
FEVER: 0

## 2019-07-02 NOTE — PATIENT INSTRUCTIONS
We will repeat lab work before the end of the year.    Move your cholesterol medication (atorvastatin 20 mg) to the evening.    Start an exercise regimen.    Keep an eye on your BP reading, minimum once a week. Call for any abnormalities.    Continue your good efforts in smoking cessation.    Follow up in one year with Dr. Gonzales.

## 2019-07-02 NOTE — LETTER
[facility]   [address]  [place] [address]  Phone: [phone]  Fax: [phone]              Brie Olivier  1950    Encounter Date: 7/2/2019    TAVO Boyer          PROGRESS NOTE:  Chief Complaint   Patient presents with   • Hypertension     PP of JI     Subjective:   Brie Olivier is a 68 y.o. female who presents today for yearly follow up for HLD and HTN in the setting of prior AAA open repair at [facility] in '18.    She is a patient of Dr. Gonzales in our office.    Hx of AAA repair in '18, HLD, HTN, and prior smoker.    She continues to use nicotine patch and occasional relapses with smoking cigarettes.     She has no concerns or complaints.    She follows with Dr. Kahn regularly for her AAA repair.    She has not been checking her BP as regularly at home.    Past Medical History:   Diagnosis Date   • AAA (abdominal aortic aneurysm) (HCC)    • Hyperlipidemia    • Hypertension    • Pneumonia 2015     Past Surgical History:   Procedure Laterality Date   • AAA WITH STENT GRAFT     • ZZZ CARDIAC CATH       Family History   Problem Relation Age of Onset   • Heart Disease Neg Hx      Social History     Social History   • Marital status:      Spouse name: N/A   • Number of children: N/A   • Years of education: N/A     Occupational History   • Not on file.     Social History Main Topics   • Smoking status: Former Smoker     Packs/day: 0.25     Years: 40.00     Types: Cigarettes     Quit date: 3/3/2018   • Smokeless tobacco: Never Used      Comment: 1-2 a day    • Alcohol use 4.2 oz/week     7 Glasses of wine per week      Comment: 1 per day   • Drug use: No   • Sexual activity: Not on file     Other Topics Concern   • Not on file     Social History Narrative   • No narrative on file     No Known Allergies  Outpatient Encounter Prescriptions as of 7/2/2019   Medication Sig Dispense Refill    "  • Docusate Calcium (STOOL SOFTENER PO) Take  by mouth.     • Loratadine (CLARITIN PO) Take  by mouth.     • metoprolol SR (TOPROL XL) 25 MG TABLET SR 24 HR Take 1 Tab by mouth 2 Times a Day. 180 Tab 3   • atorvastatin (LIPITOR) 20 MG Tab Take 1 Tab by mouth every evening. 90 Tab 3   • [DISCONTINUED] metoprolol SR (TOPROL XL) 25 MG TABLET SR 24 HR Take 1 Tab by mouth 2 Times a Day. 180 Tab 0   • [DISCONTINUED] atorvastatin (LIPITOR) 20 MG Tab TAKE 1 TABLET BY MOUTH EVERY DAY 90 Tab 0   • [DISCONTINUED] amLODIPine (NORVASC) 5 MG Tab Take 5 mg by mouth 2 Times a Day.     • [DISCONTINUED] aspirin 81 MG tablet Take 81 mg by mouth every day.     • [DISCONTINUED] cyclobenzaprine (FLEXERIL) 5 MG tablet Take 5-10 mg by mouth 3 times a day as needed.     • [DISCONTINUED] Melatonin 5 MG Cap Take  by mouth.     • [DISCONTINUED] Psyllium (METAMUCIL FIBER PO) Take  by mouth.       No facility-administered encounter medications on file as of 7/2/2019.      Review of Systems   Constitutional: Negative for fever and malaise/fatigue.   Respiratory: Negative for cough and shortness of breath.    Cardiovascular: Negative for chest pain, palpitations, orthopnea, claudication, leg swelling and PND.   Gastrointestinal: Negative for abdominal pain.   Musculoskeletal: Negative for myalgias.   Neurological: Negative for dizziness.        Objective:   /88 (BP Location: Right arm, Patient Position: Sitting, BP Cuff Size: Adult)   Pulse 60   Ht 1.626 m (5' 4\")   Wt 59.5 kg (131 lb 4.5 oz)   SpO2 99%   BMI 22.53 kg/m²      Physical Exam   Constitutional: She is oriented to person, place, and time. She appears well-developed and well-nourished.   HENT:   Head: Normocephalic and atraumatic.   Eyes: EOM are normal.   Neck: No JVD present.   Cardiovascular: Normal rate, regular rhythm, normal heart sounds and intact distal pulses.    Pulmonary/Chest: Effort normal and breath sounds normal.   Musculoskeletal: Normal range of motion. She " exhibits no edema.   Neurological: She is alert and oriented to person, place, and time.   Skin: Skin is warm and dry.   Nursing note and vitals reviewed.      Assessment:     1. Dyslipidemia  Lipid Profile    Comp Metabolic Panel    atorvastatin (LIPITOR) 20 MG Tab   2. Essential hypertension, benign  CBC WITHOUT DIFFERENTIAL   3. Thoracoabdominal aortic aneurysm (TAAA) without rupture (HCC)  CBC WITHOUT DIFFERENTIAL   4. Tobacco abuse     5. Essential hypertension  metoprolol SR (TOPROL XL) 25 MG TABLET SR 24 HR     Medical Decision Making:  Today's Assessment / Status / Plan:     1. AAA repair  -imaging and surveillance by Dr. Kahn  -keep BP controlled, smoking cessation    2. HTN  -good control on toprol 25 mg BID  -refilled one year    3. HLD  -statin  -LDL goal <70 with vascular history  -repeat lipid and cmp this year    4. Tobacco abuse  -intermittent smoking  -recommend cessation, no resources wanted at this time    FU in clinic in 12 months with JI with labs    Patient verbalizes understanding and agrees with the plan of care.     Collaborating MD: Susu HALE        No Recipients

## 2019-07-02 NOTE — PROGRESS NOTES
Chief Complaint   Patient presents with   • Hypertension     PP of JI     Subjective:   Brie Olivier is a 68 y.o. female who presents today for yearly follow up for HLD and HTN in the setting of prior AAA open repair at [facility] in '18.    She is a patient of Dr. Gonzales in our office.    Hx of AAA repair in '18, HLD, HTN, and prior smoker.    She continues to use nicotine patch and occasional relapses with smoking cigarettes.     She has no concerns or complaints.    She follows with Dr. Kahn regularly for her AAA repair.    She has not been checking her BP as regularly at home.    Past Medical History:   Diagnosis Date   • AAA (abdominal aortic aneurysm) (HCC)    • Hyperlipidemia    • Hypertension    • Pneumonia 2015     Past Surgical History:   Procedure Laterality Date   • AAA WITH STENT GRAFT     • ZZZ CARDIAC CATH       Family History   Problem Relation Age of Onset   • Heart Disease Neg Hx      Social History     Social History   • Marital status:      Spouse name: N/A   • Number of children: N/A   • Years of education: N/A     Occupational History   • Not on file.     Social History Main Topics   • Smoking status: Former Smoker     Packs/day: 0.25     Years: 40.00     Types: Cigarettes     Quit date: 3/3/2018   • Smokeless tobacco: Never Used      Comment: 1-2 a day    • Alcohol use 4.2 oz/week     7 Glasses of wine per week      Comment: 1 per day   • Drug use: No   • Sexual activity: Not on file     Other Topics Concern   • Not on file     Social History Narrative   • No narrative on file     No Known Allergies  Outpatient Encounter Prescriptions as of 7/2/2019   Medication Sig Dispense Refill   • Docusate Calcium (STOOL SOFTENER PO) Take  by mouth.     • Loratadine (CLARITIN PO) Take  by mouth.     • metoprolol SR (TOPROL XL) 25 MG TABLET SR 24 HR Take 1 Tab by mouth 2 Times a Day. 180 Tab 3   • atorvastatin (LIPITOR) 20 MG Tab Take 1 Tab by mouth every evening. 90 Tab  "3   • [DISCONTINUED] metoprolol SR (TOPROL XL) 25 MG TABLET SR 24 HR Take 1 Tab by mouth 2 Times a Day. 180 Tab 0   • [DISCONTINUED] atorvastatin (LIPITOR) 20 MG Tab TAKE 1 TABLET BY MOUTH EVERY DAY 90 Tab 0   • [DISCONTINUED] amLODIPine (NORVASC) 5 MG Tab Take 5 mg by mouth 2 Times a Day.     • [DISCONTINUED] aspirin 81 MG tablet Take 81 mg by mouth every day.     • [DISCONTINUED] cyclobenzaprine (FLEXERIL) 5 MG tablet Take 5-10 mg by mouth 3 times a day as needed.     • [DISCONTINUED] Melatonin 5 MG Cap Take  by mouth.     • [DISCONTINUED] Psyllium (METAMUCIL FIBER PO) Take  by mouth.       No facility-administered encounter medications on file as of 7/2/2019.      Review of Systems   Constitutional: Negative for fever and malaise/fatigue.   Respiratory: Negative for cough and shortness of breath.    Cardiovascular: Negative for chest pain, palpitations, orthopnea, claudication, leg swelling and PND.   Gastrointestinal: Negative for abdominal pain.   Musculoskeletal: Negative for myalgias.   Neurological: Negative for dizziness.        Objective:   /88 (BP Location: Right arm, Patient Position: Sitting, BP Cuff Size: Adult)   Pulse 60   Ht 1.626 m (5' 4\")   Wt 59.5 kg (131 lb 4.5 oz)   SpO2 99%   BMI 22.53 kg/m²     Physical Exam   Constitutional: She is oriented to person, place, and time. She appears well-developed and well-nourished.   HENT:   Head: Normocephalic and atraumatic.   Eyes: EOM are normal.   Neck: No JVD present.   Cardiovascular: Normal rate, regular rhythm, normal heart sounds and intact distal pulses.    Pulmonary/Chest: Effort normal and breath sounds normal.   Musculoskeletal: Normal range of motion. She exhibits no edema.   Neurological: She is alert and oriented to person, place, and time.   Skin: Skin is warm and dry.   Nursing note and vitals reviewed.      Assessment:     1. Dyslipidemia  Lipid Profile    Comp Metabolic Panel    atorvastatin (LIPITOR) 20 MG Tab   2. Essential " hypertension, benign  CBC WITHOUT DIFFERENTIAL   3. Thoracoabdominal aortic aneurysm (TAAA) without rupture (HCC)  CBC WITHOUT DIFFERENTIAL   4. Tobacco abuse     5. Essential hypertension  metoprolol SR (TOPROL XL) 25 MG TABLET SR 24 HR     Medical Decision Making:  Today's Assessment / Status / Plan:     1. AAA repair  -imaging and surveillance by Dr. Kahn  -keep BP controlled, smoking cessation    2. HTN  -good control on toprol 25 mg BID  -refilled one year    3. HLD  -statin  -LDL goal <70 with vascular history  -repeat lipid and cmp this year    4. Tobacco abuse  -intermittent smoking  -recommend cessation, no resources wanted at this time    FU in clinic in 12 months with JI with labs    Patient verbalizes understanding and agrees with the plan of care.     Collaborating MD: Susu HALE

## 2020-06-30 DIAGNOSIS — E78.5 DYSLIPIDEMIA: ICD-10-CM

## 2020-06-30 RX ORDER — ATORVASTATIN CALCIUM 20 MG/1
TABLET, FILM COATED ORAL
Qty: 90 TAB | Refills: 0 | Status: SHIPPED | OUTPATIENT
Start: 2020-06-30 | End: 2020-10-23

## 2020-07-26 DIAGNOSIS — I10 ESSENTIAL HYPERTENSION: ICD-10-CM

## 2020-07-27 RX ORDER — METOPROLOL SUCCINATE 25 MG/1
TABLET, EXTENDED RELEASE ORAL
Qty: 180 TAB | Refills: 0 | Status: SHIPPED | OUTPATIENT
Start: 2020-07-27 | End: 2020-10-20

## 2020-09-09 ENCOUNTER — HOSPITAL ENCOUNTER (OUTPATIENT)
Dept: RADIOLOGY | Facility: MEDICAL CENTER | Age: 70
End: 2020-09-09
Attending: SURGERY
Payer: MEDICARE

## 2020-09-09 ENCOUNTER — HOSPITAL ENCOUNTER (OUTPATIENT)
Dept: LAB | Facility: MEDICAL CENTER | Age: 70
End: 2020-09-09
Attending: SURGERY
Payer: MEDICARE

## 2020-09-09 DIAGNOSIS — I71.60 THORACOABDOMINAL AORTIC ANEURYSM (TAAA) WITHOUT RUPTURE (HCC): ICD-10-CM

## 2020-09-09 LAB
BUN SERPL-MCNC: 30 MG/DL (ref 8–22)
CREAT SERPL-MCNC: 0.85 MG/DL (ref 0.5–1.4)

## 2020-09-09 PROCEDURE — 36415 COLL VENOUS BLD VENIPUNCTURE: CPT

## 2020-09-09 PROCEDURE — 700117 HCHG RX CONTRAST REV CODE 255: Performed by: SURGERY

## 2020-09-09 PROCEDURE — 84520 ASSAY OF UREA NITROGEN: CPT

## 2020-09-09 PROCEDURE — 82565 ASSAY OF CREATININE: CPT

## 2020-09-09 PROCEDURE — 71275 CT ANGIOGRAPHY CHEST: CPT

## 2020-09-09 RX ADMIN — IOHEXOL 75 ML: 350 INJECTION, SOLUTION INTRAVENOUS at 09:41

## 2020-10-19 DIAGNOSIS — I10 ESSENTIAL HYPERTENSION: ICD-10-CM

## 2020-10-20 RX ORDER — METOPROLOL SUCCINATE 25 MG/1
TABLET, EXTENDED RELEASE ORAL
Qty: 180 TAB | Refills: 0 | Status: SHIPPED | OUTPATIENT
Start: 2020-10-20 | End: 2021-01-26 | Stop reason: SDUPTHER

## 2020-10-23 DIAGNOSIS — E78.5 DYSLIPIDEMIA: ICD-10-CM

## 2020-10-23 RX ORDER — ATORVASTATIN CALCIUM 20 MG/1
TABLET, FILM COATED ORAL
Qty: 90 TAB | Refills: 0 | Status: SHIPPED | OUTPATIENT
Start: 2020-10-23 | End: 2021-01-26 | Stop reason: SDUPTHER

## 2021-01-15 DIAGNOSIS — Z23 NEED FOR VACCINATION: ICD-10-CM

## 2021-01-26 ENCOUNTER — OFFICE VISIT (OUTPATIENT)
Dept: CARDIOLOGY | Facility: MEDICAL CENTER | Age: 71
End: 2021-01-26
Payer: MEDICARE

## 2021-01-26 VITALS
HEART RATE: 60 BPM | OXYGEN SATURATION: 99 % | RESPIRATION RATE: 12 BRPM | BODY MASS INDEX: 23.39 KG/M2 | WEIGHT: 132 LBS | HEIGHT: 63 IN | SYSTOLIC BLOOD PRESSURE: 132 MMHG | DIASTOLIC BLOOD PRESSURE: 80 MMHG

## 2021-01-26 DIAGNOSIS — Z72.0 TOBACCO ABUSE: ICD-10-CM

## 2021-01-26 DIAGNOSIS — I71.60 THORACOABDOMINAL AORTIC ANEURYSM (TAAA) WITHOUT RUPTURE (HCC): ICD-10-CM

## 2021-01-26 DIAGNOSIS — E78.5 DYSLIPIDEMIA: ICD-10-CM

## 2021-01-26 DIAGNOSIS — I10 ESSENTIAL HYPERTENSION, BENIGN: ICD-10-CM

## 2021-01-26 DIAGNOSIS — I10 ESSENTIAL HYPERTENSION: ICD-10-CM

## 2021-01-26 PROCEDURE — 99214 OFFICE O/P EST MOD 30 MIN: CPT | Mod: 25 | Performed by: INTERNAL MEDICINE

## 2021-01-26 PROCEDURE — 99406 BEHAV CHNG SMOKING 3-10 MIN: CPT | Performed by: INTERNAL MEDICINE

## 2021-01-26 RX ORDER — ATORVASTATIN CALCIUM 20 MG/1
20 TABLET, FILM COATED ORAL
Qty: 90 TAB | Refills: 3 | Status: SHIPPED | OUTPATIENT
Start: 2021-01-26 | End: 2021-03-05 | Stop reason: SDUPTHER

## 2021-01-26 RX ORDER — METOPROLOL SUCCINATE 25 MG/1
25 TABLET, EXTENDED RELEASE ORAL DAILY
Qty: 180 TAB | Refills: 3 | Status: SHIPPED | OUTPATIENT
Start: 2021-01-26 | End: 2022-05-13 | Stop reason: SDUPTHER

## 2021-01-26 ASSESSMENT — ENCOUNTER SYMPTOMS
WEIGHT LOSS: 0
WEAKNESS: 0
GASTROINTESTINAL NEGATIVE: 1
PSYCHIATRIC NEGATIVE: 1
VOMITING: 0
CARDIOVASCULAR NEGATIVE: 1
CONSTITUTIONAL NEGATIVE: 1
CHILLS: 0
MYALGIAS: 0
EYES NEGATIVE: 1
BRUISES/BLEEDS EASILY: 0
COUGH: 0
NERVOUS/ANXIOUS: 0
MUSCULOSKELETAL NEGATIVE: 1
FOCAL WEAKNESS: 0
CLAUDICATION: 0
RESPIRATORY NEGATIVE: 1
BLURRED VISION: 0
DIZZINESS: 0
PALPITATIONS: 0
SHORTNESS OF BREATH: 0
ABDOMINAL PAIN: 0
FEVER: 0
NEUROLOGICAL NEGATIVE: 1
DOUBLE VISION: 0
DEPRESSION: 0
HEADACHES: 0
NAUSEA: 0

## 2021-01-26 NOTE — PROGRESS NOTES
Chief Complaint   Patient presents with   • Hypertension     F/V Dx: Essential hypertension, benign       Subjective:   Brie Olivier is a 70 y.o. female who presents today for follow up of thoracoabdominal aortic aneurysm.    Since the patient's last visit on 18, she has been doing well clinically. She denies chest pain, shortness of breath, palpitations, nausea/vomiting or diaphoresis.    Past Medical History:   Diagnosis Date   • AAA (abdominal aortic aneurysm) (HCC)    • Hyperlipidemia    • Hypertension    • Pneumonia      Past Surgical History:   Procedure Laterality Date   • AAA WITH STENT GRAFT     • ZZZ CARDIAC CATH       Family History   Problem Relation Age of Onset   • Heart Disease Neg Hx      Social History     Socioeconomic History   • Marital status:      Spouse name: Not on file   • Number of children: Not on file   • Years of education: Not on file   • Highest education level: Not on file   Occupational History   • Not on file   Social Needs   • Financial resource strain: Not on file   • Food insecurity     Worry: Not on file     Inability: Not on file   • Transportation needs     Medical: Not on file     Non-medical: Not on file   Tobacco Use   • Smoking status: Former Smoker     Packs/day: 0.25     Years: 40.00     Pack years: 10.00     Types: Cigarettes     Quit date: 3/3/2018     Years since quittin.9   • Smokeless tobacco: Never Used   • Tobacco comment: 1-2 a day    Substance and Sexual Activity   • Alcohol use: Yes     Alcohol/week: 4.2 oz     Types: 7 Glasses of wine per week     Comment: 1 per day   • Drug use: No   • Sexual activity: Not on file   Lifestyle   • Physical activity     Days per week: Not on file     Minutes per session: Not on file   • Stress: Not on file   Relationships   • Social connections     Talks on phone: Not on file     Gets together: Not on file     Attends Restorationist service: Not on file     Active member of club or organization: Not  "on file     Attends meetings of clubs or organizations: Not on file     Relationship status: Not on file   • Intimate partner violence     Fear of current or ex partner: Not on file     Emotionally abused: Not on file     Physically abused: Not on file     Forced sexual activity: Not on file   Other Topics Concern   • Not on file   Social History Narrative   • Not on file     No Known Allergies  Outpatient Encounter Medications as of 1/26/2021   Medication Sig Dispense Refill   • NICOTINE TD Place  on the skin.     • atorvastatin (LIPITOR) 20 MG Tab TAKE 1 TABLET BY MOUTH EVERY DAY IN THE EVENING 90 Tab 0   • metoprolol SR (TOPROL XL) 25 MG TABLET SR 24 HR TAKE 1 TABLET BY MOUTH TWICE A  Tab 0   • Docusate Calcium (STOOL SOFTENER PO) Take  by mouth.     • Loratadine (CLARITIN PO) Take  by mouth.       No facility-administered encounter medications on file as of 1/26/2021.      Review of Systems   Constitutional: Negative.  Negative for chills, fever, malaise/fatigue and weight loss.   HENT: Negative.  Negative for hearing loss.    Eyes: Negative.  Negative for blurred vision and double vision.   Respiratory: Negative.  Negative for cough and shortness of breath.    Cardiovascular: Negative.  Negative for chest pain, palpitations, claudication and leg swelling.   Gastrointestinal: Negative.  Negative for abdominal pain, nausea and vomiting.   Genitourinary: Negative.  Negative for dysuria and urgency.   Musculoskeletal: Negative.  Negative for joint pain and myalgias.   Skin: Negative.  Negative for itching and rash.   Neurological: Negative.  Negative for dizziness, focal weakness, weakness and headaches.   Endo/Heme/Allergies: Negative.  Does not bruise/bleed easily.   Psychiatric/Behavioral: Negative.  Negative for depression. The patient is not nervous/anxious.         Objective:   /80 (BP Location: Left arm, Patient Position: Sitting, BP Cuff Size: Adult)   Pulse 60   Resp 12   Ht 1.6 m (5' 3\")  "  Wt 59.9 kg (132 lb)   SpO2 99%   BMI 23.38 kg/m²     Physical Exam   Constitutional: She is oriented to person, place, and time. She appears well-developed and well-nourished.   HENT:   Head: Normocephalic and atraumatic.   Eyes: EOM are normal.   Neck: No JVD present.   Cardiovascular: Normal rate, regular rhythm and normal heart sounds.   Pulmonary/Chest: Effort normal and breath sounds normal.   Abdominal: Soft. Bowel sounds are normal.   No hepatosplenomegaly.   Musculoskeletal: Normal range of motion.   Lymphadenopathy:     She has no cervical adenopathy.   Neurological: She is alert and oriented to person, place, and time.   Skin: Skin is warm and dry.   Psychiatric: She has a normal mood and affect.     CARDIAC STUDIES/PROCEDURES:     CARDIAC CATHETERIZATION CONCLUSIONS (11/29/17)  1.  No angiographic evidence of coronary artery disease.  2.  Normal left ventricular systolic function with ejection fraction of 65%.  3.  Mildly elevated left ventricular end-diastolic pressure.  4.  Moderately enlarged ascending aorta.  5.  Large descending aortic aneurysm originating just beyond the aortic arch   and continuing into the abdominal aorta.    CTA OF CHEST AND ABDOMEN (09/09/20)  1.  Interval enlargement of the ascending and descending thoracic aorta as described. New soft plaque/thrombus in the posterior descending thoracic aorta. Postoperative changes from prior surgical repair are unchanged.  2.  Markedly ectatic and beaded appearance of the celiac, superior mesenteric, and renal arteries. Findings are similar to the prior exam. 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  3.  COPD.  4.  Diverticulosis.  (study result reviewed)     CTA OF CHEST (10/10/17)  1.  Aneurysmal dilatation of the thoracic aorta diffusely with maximum diameter just above the diaphragmatic hiatus measuring 4.6 x 4.4 cm.  2.  Aneurysmal dilatation of the ascending thoracic aorta with maximum AP diameter 3.6 cm.  3.   Aneurysmal dilatation of the abdominal aorta to the level of the infrarenal portion with maximum transverse dimensions of 4.6 x 4.4 cm.  4.  Patent mesenteric and renal arteries.  5.  No evidence of aortic dissection.     ECHOCARDIOGRAM CONCLUSIONS at [facility] (11/11/17)  Echocardiogram showing ejection fraction of 66%, mild tricuspid regurgitation.     EKG performed on (11/28/17) EKG shows sinus rhythm with left ventricular hypertrophy.  EKG performed on (11/17/17) EKG shows sinus rhythm.     Laboratory results of (04/13/18) were reviewed. Cholesterol profile of 129/86/33/79 mg/dL noted.  Laboratory results of (08/25/17) Cholesterol profile of 197/60/74/111 noted.     PULMONARY FUNCTION INTERPRETATION (12/13/17)  IMPRESSION:  The patient has mild obstructive ventilatory defect with FEV1 of   87% of predicted and FEV1/FVC ratio mildly decreased to 68%.  The patient also   has moderate air trapping.  These findings are consistent with the patient's   listed diagnosis of COPD.  Clinical correlation is required.      ULTRASOUND ABDOMEN (08/22/17)  1.  Abdominal aortic aneurysm measuring up to 6.2 x 6.6 cm  2.  Moderate extrahepatic biliary dilatation without evidence of intrahepatic biliary dilatation. This may be due to an anatomic variation (ductal plate malformation) however a distal obstructing lesion is not excluded.    Assessment:     1. 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 Essential hypertension, benign     3. Dyslipidemia     4. Tobacco abuse       Medical Decision Making:  Today's Assessment / Status / Plan:     1. Thoracoabdominal aortic aneurysm (with surgical repair [facility] in [place] 03/07/18): There has been interval increase of her aneurysm per her last CT. We will refer to [facility]. We will repeat an echocardiogram.  2. Hypertension: Blood pressure is well controlled. We will continue with metoprolol.  3. Hyperlipidemia: She  is doing well on statin therapy without myalgia symptoms.  4. Chronic tobacco use: Smoking cessation recommended with discussions of health effects and plans for over 3 minutes.  5. Additional information: She is a close friend of Jack Valdivia.    We will follow up in three months.     CC Nirav Mota and Spencer Lord

## 2021-02-03 ENCOUNTER — TELEPHONE (OUTPATIENT)
Dept: VASCULAR LAB | Facility: MEDICAL CENTER | Age: 71
End: 2021-02-03

## 2021-02-09 ENCOUNTER — HOSPITAL ENCOUNTER (OUTPATIENT)
Dept: LAB | Facility: MEDICAL CENTER | Age: 71
End: 2021-02-09
Attending: INTERNAL MEDICINE
Payer: MEDICARE

## 2021-02-09 DIAGNOSIS — E78.5 DYSLIPIDEMIA: ICD-10-CM

## 2021-02-09 LAB
ALBUMIN SERPL BCP-MCNC: 4.3 G/DL (ref 3.2–4.9)
ALBUMIN/GLOB SERPL: 1.7 G/DL
ALP SERPL-CCNC: 72 U/L (ref 30–99)
ALT SERPL-CCNC: 17 U/L (ref 2–50)
ANION GAP SERPL CALC-SCNC: 11 MMOL/L (ref 7–16)
AST SERPL-CCNC: 22 U/L (ref 12–45)
BILIRUB SERPL-MCNC: 0.4 MG/DL (ref 0.1–1.5)
BUN SERPL-MCNC: 22 MG/DL (ref 8–22)
CALCIUM SERPL-MCNC: 9.4 MG/DL (ref 8.4–10.2)
CHLORIDE SERPL-SCNC: 107 MMOL/L (ref 96–112)
CHOLEST SERPL-MCNC: 162 MG/DL (ref 100–199)
CO2 SERPL-SCNC: 25 MMOL/L (ref 20–33)
CREAT SERPL-MCNC: 0.75 MG/DL (ref 0.5–1.4)
FASTING STATUS PATIENT QL REPORTED: NORMAL
GLOBULIN SER CALC-MCNC: 2.6 G/DL (ref 1.9–3.5)
GLUCOSE SERPL-MCNC: 108 MG/DL (ref 65–99)
HDLC SERPL-MCNC: 70 MG/DL
LDLC SERPL CALC-MCNC: 79 MG/DL
POTASSIUM SERPL-SCNC: 4.8 MMOL/L (ref 3.6–5.5)
PROT SERPL-MCNC: 6.9 G/DL (ref 6–8.2)
SODIUM SERPL-SCNC: 143 MMOL/L (ref 135–145)
TRIGL SERPL-MCNC: 65 MG/DL (ref 0–149)

## 2021-02-09 PROCEDURE — 80061 LIPID PANEL: CPT

## 2021-02-09 PROCEDURE — 80053 COMPREHEN METABOLIC PANEL: CPT

## 2021-02-09 PROCEDURE — 36415 COLL VENOUS BLD VENIPUNCTURE: CPT

## 2021-02-26 ENCOUNTER — HOSPITAL ENCOUNTER (OUTPATIENT)
Dept: CARDIOLOGY | Facility: MEDICAL CENTER | Age: 71
End: 2021-02-26
Attending: INTERNAL MEDICINE
Payer: MEDICARE

## 2021-02-26 DIAGNOSIS — I71.60 THORACOABDOMINAL AORTIC ANEURYSM (TAAA) WITHOUT RUPTURE (HCC): ICD-10-CM

## 2021-02-26 PROCEDURE — 93306 TTE W/DOPPLER COMPLETE: CPT

## 2021-03-01 ENCOUNTER — TELEPHONE (OUTPATIENT)
Dept: CARDIOLOGY | Facility: MEDICAL CENTER | Age: 71
End: 2021-03-01

## 2021-03-01 LAB
LV EJECT FRACT  99904: 65
LV EJECT FRACT MOD 2C 99903: 65.43
LV EJECT FRACT MOD 4C 99902: 76.24
LV EJECT FRACT MOD BP 99901: 70.74

## 2021-03-01 PROCEDURE — 93306 TTE W/DOPPLER COMPLETE: CPT | Mod: 26 | Performed by: INTERNAL MEDICINE

## 2021-03-01 NOTE — TELEPHONE ENCOUNTER
Message  Received: Today  Message Contents   SHAHANA Rader R.N.   Please call with unremarkable study, echocardiogram. Ascending aorta measures normal on this study, however, CT results we obtained are more accurate.     Thanks.  KUN   -------------------------------------------------------------  Notified patient of results. She states she has been evaluated and being followed by Dr. Kahn.

## 2021-03-05 DIAGNOSIS — E78.5 DYSLIPIDEMIA: ICD-10-CM

## 2021-03-05 DIAGNOSIS — I10 ESSENTIAL HYPERTENSION: ICD-10-CM

## 2021-03-05 RX ORDER — ATORVASTATIN CALCIUM 80 MG/1
80 TABLET, FILM COATED ORAL
Qty: 90 TABLET | Refills: 3 | Status: SHIPPED | OUTPATIENT
Start: 2021-03-05 | End: 2022-08-24

## 2021-03-06 NOTE — TELEPHONE ENCOUNTER
Returned patient's phone call, and she states she already spoke to someone regarding her recent Lipid Panel and increase in atorvastatin.

## 2021-03-06 NOTE — TELEPHONE ENCOUNTER
PT called and left a VM stating they are returning your call in regards to test results. They can be reached at [phone].    Thank you,  Mari BROCK

## 2021-04-09 ENCOUNTER — TELEPHONE (OUTPATIENT)
Dept: VASCULAR LAB | Facility: MEDICAL CENTER | Age: 71
End: 2021-04-09

## 2021-05-20 ENCOUNTER — HOSPITAL ENCOUNTER (OUTPATIENT)
Dept: LAB | Facility: MEDICAL CENTER | Age: 71
End: 2021-05-20
Attending: NURSE PRACTITIONER
Payer: MEDICARE

## 2021-05-20 DIAGNOSIS — E78.5 DYSLIPIDEMIA: ICD-10-CM

## 2021-05-20 DIAGNOSIS — I10 ESSENTIAL HYPERTENSION: ICD-10-CM

## 2021-05-20 LAB
ALBUMIN SERPL BCP-MCNC: 4.4 G/DL (ref 3.2–4.9)
ALBUMIN/GLOB SERPL: 1.5 G/DL
ALP SERPL-CCNC: 75 U/L (ref 30–99)
ALT SERPL-CCNC: 26 U/L (ref 2–50)
ANION GAP SERPL CALC-SCNC: 11 MMOL/L (ref 7–16)
AST SERPL-CCNC: 32 U/L (ref 12–45)
BILIRUB SERPL-MCNC: 0.4 MG/DL (ref 0.1–1.5)
BUN SERPL-MCNC: 31 MG/DL (ref 8–22)
CALCIUM SERPL-MCNC: 9.7 MG/DL (ref 8.4–10.2)
CHLORIDE SERPL-SCNC: 104 MMOL/L (ref 96–112)
CHOLEST SERPL-MCNC: 151 MG/DL (ref 100–199)
CO2 SERPL-SCNC: 24 MMOL/L (ref 20–33)
CREAT SERPL-MCNC: 0.95 MG/DL (ref 0.5–1.4)
FASTING STATUS PATIENT QL REPORTED: NORMAL
GLOBULIN SER CALC-MCNC: 2.9 G/DL (ref 1.9–3.5)
GLUCOSE SERPL-MCNC: 102 MG/DL (ref 65–99)
HDLC SERPL-MCNC: 69 MG/DL
LDLC SERPL CALC-MCNC: 70 MG/DL
POTASSIUM SERPL-SCNC: 4.7 MMOL/L (ref 3.6–5.5)
PROT SERPL-MCNC: 7.3 G/DL (ref 6–8.2)
SODIUM SERPL-SCNC: 139 MMOL/L (ref 135–145)
TRIGL SERPL-MCNC: 60 MG/DL (ref 0–149)

## 2021-05-20 PROCEDURE — 80061 LIPID PANEL: CPT

## 2021-05-20 PROCEDURE — 36415 COLL VENOUS BLD VENIPUNCTURE: CPT

## 2021-05-20 PROCEDURE — 80053 COMPREHEN METABOLIC PANEL: CPT

## 2021-08-10 ENCOUNTER — DOCUMENTATION (OUTPATIENT)
Dept: VASCULAR LAB | Facility: MEDICAL CENTER | Age: 71
End: 2021-08-10

## 2021-08-10 NOTE — PROGRESS NOTES
Called pt to schedule her for new pt. Pt was in the middle of smelting but told me to go ahead and schedule her for an appointment and that she would call me back to get the time and date.   I told her I would call her in about a week if I didn't hear from her.

## 2021-11-05 ENCOUNTER — DOCUMENTATION (OUTPATIENT)
Dept: VASCULAR LAB | Facility: MEDICAL CENTER | Age: 71
End: 2021-11-05

## 2021-11-05 NOTE — PROGRESS NOTES
Patient referred for evaluation and management in the vascular care clinic for TAA surveillance from Dr. Fredy Gonzales (cardiology).   Unfortunately patient missed appointment for initial visit in our office today.  We will be unable to take part in care until or unless patient makes an appointment for a face-to-face visit in our center  We will ask our  or medical assistant to call and reschedule     Pending further patient contact, we will defer all vascular care, including management of cardiovascular risk factors, to PCP and other members of the care team    Vascular Medicine Clinic   [facility]   [phone]

## 2021-11-16 ENCOUNTER — HOSPITAL ENCOUNTER (OUTPATIENT)
Dept: LAB | Facility: MEDICAL CENTER | Age: 71
End: 2021-11-16
Attending: FAMILY MEDICINE
Payer: MEDICARE

## 2021-11-16 LAB
ALBUMIN SERPL BCP-MCNC: 4.2 G/DL (ref 3.2–4.9)
ALBUMIN/GLOB SERPL: 1.7 G/DL
ALP SERPL-CCNC: 72 U/L (ref 30–99)
ALT SERPL-CCNC: 16 U/L (ref 2–50)
ANION GAP SERPL CALC-SCNC: 10 MMOL/L (ref 7–16)
AST SERPL-CCNC: 20 U/L (ref 12–45)
BASOPHILS # BLD AUTO: 0.8 % (ref 0–1.8)
BASOPHILS # BLD: 0.04 K/UL (ref 0–0.12)
BILIRUB SERPL-MCNC: 0.5 MG/DL (ref 0.1–1.5)
BUN SERPL-MCNC: 20 MG/DL (ref 8–22)
CALCIUM SERPL-MCNC: 9.4 MG/DL (ref 8.4–10.2)
CHLORIDE SERPL-SCNC: 107 MMOL/L (ref 96–112)
CHOLEST SERPL-MCNC: 172 MG/DL (ref 100–199)
CO2 SERPL-SCNC: 23 MMOL/L (ref 20–33)
CREAT SERPL-MCNC: 0.81 MG/DL (ref 0.5–1.4)
CREAT UR-MCNC: 56.61 MG/DL
EOSINOPHIL # BLD AUTO: 0.14 K/UL (ref 0–0.51)
EOSINOPHIL NFR BLD: 2.8 % (ref 0–6.9)
ERYTHROCYTE [DISTWIDTH] IN BLOOD BY AUTOMATED COUNT: 48.2 FL (ref 35.9–50)
EST. AVERAGE GLUCOSE BLD GHB EST-MCNC: 103 MG/DL
FASTING STATUS PATIENT QL REPORTED: NORMAL
GLOBULIN SER CALC-MCNC: 2.5 G/DL (ref 1.9–3.5)
GLUCOSE SERPL-MCNC: 89 MG/DL (ref 65–99)
HBA1C MFR BLD: 5.2 % (ref 4–5.6)
HCT VFR BLD AUTO: 39.6 % (ref 37–47)
HDLC SERPL-MCNC: 72 MG/DL
HGB BLD-MCNC: 12.8 G/DL (ref 12–16)
IMM GRANULOCYTES # BLD AUTO: 0.01 K/UL (ref 0–0.11)
IMM GRANULOCYTES NFR BLD AUTO: 0.2 % (ref 0–0.9)
LDLC SERPL CALC-MCNC: 88 MG/DL
LYMPHOCYTES # BLD AUTO: 1.88 K/UL (ref 1–4.8)
LYMPHOCYTES NFR BLD: 38.2 % (ref 22–41)
MCH RBC QN AUTO: 32.2 PG (ref 27–33)
MCHC RBC AUTO-ENTMCNC: 32.3 G/DL (ref 33.6–35)
MCV RBC AUTO: 99.7 FL (ref 81.4–97.8)
MICROALBUMIN UR-MCNC: 1.8 MG/DL
MICROALBUMIN/CREAT UR: 32 MG/G (ref 0–30)
MONOCYTES # BLD AUTO: 0.44 K/UL (ref 0–0.85)
MONOCYTES NFR BLD AUTO: 8.9 % (ref 0–13.4)
NEUTROPHILS # BLD AUTO: 2.41 K/UL (ref 2–7.15)
NEUTROPHILS NFR BLD: 49.1 % (ref 44–72)
NRBC # BLD AUTO: 0 K/UL
NRBC BLD-RTO: 0 /100 WBC
PLATELET # BLD AUTO: 172 K/UL (ref 164–446)
PMV BLD AUTO: 9.7 FL (ref 9–12.9)
POTASSIUM SERPL-SCNC: 4.3 MMOL/L (ref 3.6–5.5)
PROT SERPL-MCNC: 6.7 G/DL (ref 6–8.2)
RBC # BLD AUTO: 3.97 M/UL (ref 4.2–5.4)
SODIUM SERPL-SCNC: 140 MMOL/L (ref 135–145)
TRIGL SERPL-MCNC: 62 MG/DL (ref 0–149)
TSH SERPL DL<=0.005 MIU/L-ACNC: 1.79 UIU/ML (ref 0.38–5.33)
WBC # BLD AUTO: 4.9 K/UL (ref 4.8–10.8)

## 2021-11-16 PROCEDURE — 36415 COLL VENOUS BLD VENIPUNCTURE: CPT

## 2021-11-16 PROCEDURE — 84443 ASSAY THYROID STIM HORMONE: CPT

## 2021-11-16 PROCEDURE — 80053 COMPREHEN METABOLIC PANEL: CPT

## 2021-11-16 PROCEDURE — 82043 UR ALBUMIN QUANTITATIVE: CPT

## 2021-11-16 PROCEDURE — 83036 HEMOGLOBIN GLYCOSYLATED A1C: CPT

## 2021-11-16 PROCEDURE — 85025 COMPLETE CBC W/AUTO DIFF WBC: CPT

## 2021-11-16 PROCEDURE — 82570 ASSAY OF URINE CREATININE: CPT

## 2021-11-16 PROCEDURE — 80061 LIPID PANEL: CPT

## 2021-11-16 PROCEDURE — 82306 VITAMIN D 25 HYDROXY: CPT

## 2021-11-19 LAB — 25(OH)D3 SERPL-MCNC: 19 NG/ML (ref 30–80)

## 2022-05-13 ENCOUNTER — TELEPHONE (OUTPATIENT)
Dept: CARDIOLOGY | Facility: MEDICAL CENTER | Age: 72
End: 2022-05-13
Payer: MEDICARE

## 2022-05-13 DIAGNOSIS — I10 ESSENTIAL HYPERTENSION: ICD-10-CM

## 2022-05-13 RX ORDER — METOPROLOL SUCCINATE 25 MG/1
25 TABLET, EXTENDED RELEASE ORAL DAILY
Qty: 180 TABLET | Refills: 0 | Status: SHIPPED | OUTPATIENT
Start: 2022-05-13 | End: 2022-05-26 | Stop reason: SDUPTHER

## 2022-05-13 NOTE — TELEPHONE ENCOUNTER
KUN    Caller: Brie Olivier  Medication Name and Dosage: metoprolol SR (TOPROL XL) 25 MG TABLET SR 24 HR  Did patient contact pharmacy: Yes  Medication amount left: 1 week  Preferred Pharmacy: [facility]  Other questions (Topic): N/A  Callback Number (Will only call for issues): [phone]

## 2022-05-26 ENCOUNTER — OFFICE VISIT (OUTPATIENT)
Dept: CARDIOLOGY | Facility: MEDICAL CENTER | Age: 72
End: 2022-05-26
Payer: MEDICARE

## 2022-05-26 VITALS
HEART RATE: 52 BPM | RESPIRATION RATE: 14 BRPM | SYSTOLIC BLOOD PRESSURE: 128 MMHG | OXYGEN SATURATION: 98 % | BODY MASS INDEX: 22.86 KG/M2 | WEIGHT: 129 LBS | DIASTOLIC BLOOD PRESSURE: 68 MMHG | HEIGHT: 63 IN

## 2022-05-26 DIAGNOSIS — E78.5 DYSLIPIDEMIA: ICD-10-CM

## 2022-05-26 DIAGNOSIS — I10 ESSENTIAL HYPERTENSION, BENIGN: ICD-10-CM

## 2022-05-26 DIAGNOSIS — I71.60 THORACOABDOMINAL AORTIC ANEURYSM (TAAA) WITHOUT RUPTURE (HCC): ICD-10-CM

## 2022-05-26 DIAGNOSIS — I10 ESSENTIAL HYPERTENSION: ICD-10-CM

## 2022-05-26 LAB — EKG IMPRESSION: NORMAL

## 2022-05-26 PROCEDURE — 93000 ELECTROCARDIOGRAM COMPLETE: CPT | Performed by: INTERNAL MEDICINE

## 2022-05-26 PROCEDURE — 99214 OFFICE O/P EST MOD 30 MIN: CPT | Mod: 25 | Performed by: INTERNAL MEDICINE

## 2022-05-26 RX ORDER — METOPROLOL SUCCINATE 25 MG/1
25 TABLET, EXTENDED RELEASE ORAL DAILY
Qty: 180 TABLET | Refills: 3 | Status: SHIPPED | OUTPATIENT
Start: 2022-05-26 | End: 2022-08-24

## 2022-05-26 RX ORDER — ATORVASTATIN CALCIUM 20 MG/1
TABLET, FILM COATED ORAL
COMMUNITY
Start: 2022-05-24 | End: 2022-08-24

## 2022-05-26 ASSESSMENT — ENCOUNTER SYMPTOMS
DIZZINESS: 0
DEPRESSION: 0
WEAKNESS: 0
COUGH: 0
GASTROINTESTINAL NEGATIVE: 1
FEVER: 0
VOMITING: 0
SHORTNESS OF BREATH: 0
HEADACHES: 0
FOCAL WEAKNESS: 0
PSYCHIATRIC NEGATIVE: 1
NAUSEA: 0
NEUROLOGICAL NEGATIVE: 1
ABDOMINAL PAIN: 0
CONSTITUTIONAL NEGATIVE: 1
NERVOUS/ANXIOUS: 0
EYES NEGATIVE: 1
WEIGHT LOSS: 0
RESPIRATORY NEGATIVE: 1
MYALGIAS: 0
CARDIOVASCULAR NEGATIVE: 1
MUSCULOSKELETAL NEGATIVE: 1
BLURRED VISION: 0
BRUISES/BLEEDS EASILY: 0
CLAUDICATION: 0
DOUBLE VISION: 0
PALPITATIONS: 0
CHILLS: 0

## 2022-05-26 ASSESSMENT — FIBROSIS 4 INDEX: FIB4 SCORE: 2.06

## 2022-05-26 NOTE — PROGRESS NOTES
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 female who presents today for annual follow up of hypertension and hyperlipidemia, ascending aortic aneurysm, abdominal aortic aneurysm.    Since the patient's last visit on 21, she has been doing well clinically. She denies chest pain, shortness of breath, palpitations, nausea/vomiting or diaphoresis. She was referred to [facility] last year, however, she did not undergo the consultation in . She keeps active exercising 4 times per week. She stopped smoking and is using the patch.     Past Medical History:   Diagnosis Date   • AAA (abdominal aortic aneurysm) (HCC)    • Hyperlipidemia    • Hypertension    • Pneumonia      Past Surgical History:   Procedure Laterality Date   • AAA WITH STENT GRAFT     • ZZZ CARDIAC CATH       Family History   Problem Relation Age of Onset   • Heart Disease Neg Hx      Social History     Socioeconomic History   • Marital status:      Spouse name: Not on file   • Number of children: Not on file   • Years of education: Not on file   • Highest education level: Not on file   Occupational History   • Not on file   Tobacco Use   • Smoking status: Former Smoker     Packs/day: 0.25     Years: 40.00     Pack years: 10.00     Types: Cigarettes     Quit date: 3/3/2018     Years since quittin.2   • Smokeless tobacco: Never Used   • Tobacco comment: 1-2 a day    Substance and Sexual Activity   • Alcohol use: Yes     Alcohol/week: 4.2 oz     Types: 7 Glasses of wine per week     Comment: 1 per day   • Drug use: No   • Sexual activity: Not on file   Other Topics Concern   • Not on file   Social History Narrative   • Not on file     Social Determinants of Health     Financial Resource Strain: Not on file   Food Insecurity: Not on file   Transportation Needs: Not on  file   Physical Activity: Not on file   Stress: Not on file   Social Connections: Not on file   Intimate Partner Violence: Not on file   Housing Stability: Not on file     No Known Allergies     (Medications reviewed.)  Outpatient Encounter Medications as of 5/26/2022   Medication Sig Dispense Refill   • metoprolol tartrate (LOPRESSOR) 25 MG Tab TAKE 1 TABLET BY MOUTH 2 TIMES PER DAY WITH FOOD     • atorvastatin (LIPITOR) 80 MG tablet Take 1 tablet by mouth every day. 90 tablet 3   • NICOTINE TD Place  on the skin.     • Docusate Calcium (STOOL SOFTENER PO) Take  by mouth.     • Loratadine (CLARITIN PO) Take  by mouth.     • atorvastatin (LIPITOR) 20 MG Tab  (Patient not taking: Reported on 5/26/2022)     • metoprolol SR (TOPROL XL) 25 MG TABLET SR 24 HR Take 1 Tablet by mouth every day. Must be seen for further refills. (Patient not taking: Reported on 5/26/2022) 180 Tablet 0     No facility-administered encounter medications on file as of 5/26/2022.     Review of Systems   Constitutional: Negative.  Negative for chills, fever, malaise/fatigue and weight loss.   HENT: Negative.  Negative for hearing loss.    Eyes: Negative.  Negative for blurred vision and double vision.   Respiratory: Negative.  Negative for cough and shortness of breath.    Cardiovascular: Negative.  Negative for chest pain, palpitations, claudication and leg swelling.   Gastrointestinal: Negative.  Negative for abdominal pain, nausea and vomiting.   Genitourinary: Negative.  Negative for dysuria and urgency.   Musculoskeletal: Negative.  Negative for joint pain and myalgias.   Skin: Negative.  Negative for itching and rash.   Neurological: Negative.  Negative for dizziness, focal weakness, weakness and headaches.   Endo/Heme/Allergies: Negative.  Does not bruise/bleed easily.   Psychiatric/Behavioral: Negative.  Negative for depression. The patient is not nervous/anxious.               Objective     /68 (BP Location: Left arm, Patient  "Position: Sitting, BP Cuff Size: Adult)   Pulse (!) 52   Resp 14   Ht 1.6 m (5' 3\")   Wt 58.5 kg (129 lb)   SpO2 98%   BMI 22.85 kg/m²     Physical Exam  Constitutional:       Appearance: She is well-developed.   HENT:      Head: Normocephalic and atraumatic.   Neck:      Vascular: No JVD.   Cardiovascular:      Rate and Rhythm: Normal rate and regular rhythm.      Heart sounds: Normal heart sounds.   Pulmonary:      Effort: Pulmonary effort is normal.      Breath sounds: Normal breath sounds.   Abdominal:      General: Bowel sounds are normal.      Palpations: Abdomen is soft.      Comments: No hepatosplenomegaly.   Musculoskeletal:         General: Normal range of motion.   Lymphadenopathy:      Cervical: No cervical adenopathy.   Skin:     General: Skin is warm and dry.   Neurological:      Mental Status: She is alert and oriented to person, place, and time.            CARDIAC STUDIES/PROCEDURES:     CARDIAC CATHETERIZATION CONCLUSIONS (11/29/17)  1.  No angiographic evidence of coronary artery disease.  2.  Normal left ventricular systolic function with ejection fraction of 65%.  3.  Mildly elevated left ventricular end-diastolic pressure.  4.  Moderately enlarged ascending aorta.  5.  Large descending aortic aneurysm originating just beyond the aortic arch   and continuing into the abdominal aorta.     CTA OF CHEST AND ABDOMEN (09/09/20)  1.  Interval enlargement of the ascending and descending thoracic aorta as described. New soft plaque/thrombus in the posterior descending thoracic aorta. Postoperative changes from prior surgical repair are unchanged.  2.  Markedly ectatic and beaded appearance of the celiac, superior mesenteric, and renal arteries. Findings are similar to the prior exam. 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  3.  COPD.  4.  Diverticulosis.    CTA OF CHEST (10/10/17)  1.  Aneurysmal dilatation of the thoracic aorta diffusely with maximum diameter just above " the diaphragmatic hiatus measuring 4.6 x 4.4 cm.  2.  Aneurysmal dilatation of the ascending thoracic aorta with maximum AP diameter 3.6 cm.  3.  Aneurysmal dilatation of the abdominal aorta to the level of the infrarenal portion with maximum transverse dimensions of 4.6 x 4.4 cm.  4.  Patent mesenteric and renal arteries.  5.  No evidence of aortic dissection.     ECHOCARDIOGRAM CONCLUSIONS (02/26/21)  No prior study is available for comparison.   Normal left ventricular systolic function.  Left ventricular ejection fraction is visually estimated to be 65%.  Mild mitral regurgitation.  Mild tricuspid regurgitation.  Mild pulmonic insufficiency.  Ascending aorta diameter is 3.9 cm.  (study result reviewed)    ECHOCARDIOGRAM CONCLUSIONS at [facility] (11/11/17)  Echocardiogram showing ejection fraction of 66%, mild tricuspid regurgitation.     EKG was ordered for ascending aortic aneurysm, hypertension, performed on (05/26/22) was reviewed: EKG, personally interpreted shows sinus bradycardia.  EKG performed on (11/28/17) EKG shows sinus rhythm with left ventricular hypertrophy.  EKG performed on (11/17/17) EKG shows sinus rhythm.     Laboratory results of (04/13/18) were reviewed. Cholesterol profile of 129/86/33/79 mg/dL noted.  Laboratory results of (08/25/17) Cholesterol profile of 197/60/74/111 noted.     PULMONARY FUNCTION INTERPRETATION (12/13/17)  IMPRESSION:  The patient has mild obstructive ventilatory defect with FEV1 of   87% of predicted and FEV1/FVC ratio mildly decreased to 68%.  The patient also   has moderate air trapping.  These findings are consistent with the patient's   listed diagnosis of COPD.  Clinical correlation is required.      ULTRASOUND ABDOMEN (08/22/17)  1.  Abdominal aortic aneurysm measuring up to 6.2 x 6.6 cm  2.  Moderate extrahepatic biliary dilatation without evidence of intrahepatic biliary dilatation. This may be due to an anatomic variation (ductal plate malformation)  however a distal obstructing lesion is not excluded.    Assessment & Plan     1. Essential hypertension, benign  EKG   2. Dyslipidemia  EKG   3. 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 Tobacco abuse  EKG       Medical Decision Making: Today's Assessment/Status/Plan:      1. Hypertension: Blood pressure is well controlled. We will continue with metoprolol.  2. Hyperlipidemia: She is doing well on statin therapy without myalgia symptoms. (Managed by primary care physician)  3. Ascending aortic aneurysm (with surgical repair [facility] in [place] 03/07/18):   4. Abdominal aortic aneurysm: He is clinically stable on medical therapy including beta blockade therapy. We will repeat an echocardiogram. We will again refer to [facility].  5. Additional information: She is a close friend of Jack Shea.     We will follow up in three months.      CC Nirav Mota and Spencer Lord          Dictation #1  MRN:[id number]  [facility]:[id number]

## 2022-08-24 ENCOUNTER — OFFICE VISIT (OUTPATIENT)
Dept: VASCULAR LAB | Facility: MEDICAL CENTER | Age: 72
End: 2022-08-24
Attending: INTERNAL MEDICINE
Payer: MEDICARE

## 2022-08-24 VITALS
SYSTOLIC BLOOD PRESSURE: 144 MMHG | HEIGHT: 64 IN | WEIGHT: 130.4 LBS | HEART RATE: 54 BPM | DIASTOLIC BLOOD PRESSURE: 80 MMHG | BODY MASS INDEX: 22.26 KG/M2

## 2022-08-24 DIAGNOSIS — I71.60 THORACOABDOMINAL AORTIC ANEURYSM (TAAA) WITHOUT RUPTURE (HCC): ICD-10-CM

## 2022-08-24 DIAGNOSIS — Z72.0 TOBACCO ABUSE: ICD-10-CM

## 2022-08-24 DIAGNOSIS — Z00.6 RESEARCH STUDY PATIENT: ICD-10-CM

## 2022-08-24 DIAGNOSIS — E78.5 DYSLIPIDEMIA: ICD-10-CM

## 2022-08-24 DIAGNOSIS — I10 ESSENTIAL HYPERTENSION: ICD-10-CM

## 2022-08-24 DIAGNOSIS — I10 ESSENTIAL HYPERTENSION, BENIGN: ICD-10-CM

## 2022-08-24 PROCEDURE — 99212 OFFICE O/P EST SF 10 MIN: CPT

## 2022-08-24 PROCEDURE — 99205 OFFICE O/P NEW HI 60 MIN: CPT | Performed by: INTERNAL MEDICINE

## 2022-08-24 RX ORDER — ASPIRIN 81 MG/1
81 TABLET, CHEWABLE ORAL DAILY
Qty: 100 TABLET | Refills: 11
Start: 2022-08-24

## 2022-08-24 RX ORDER — METOPROLOL SUCCINATE 25 MG/1
25 TABLET, EXTENDED RELEASE ORAL DAILY
Qty: 90 TABLET | Refills: 3 | Status: SHIPPED
Start: 2022-08-24 | End: 2023-10-03

## 2022-08-24 RX ORDER — ATORVASTATIN CALCIUM 80 MG/1
80 TABLET, FILM COATED ORAL
Qty: 90 TABLET | Refills: 3 | Status: SHIPPED
Start: 2022-08-24

## 2022-08-24 RX ORDER — EZETIMIBE 10 MG/1
10 TABLET ORAL DAILY
Qty: 30 TABLET | Refills: 11 | Status: SHIPPED | OUTPATIENT
Start: 2022-08-24 | End: 2022-09-19

## 2022-08-24 ASSESSMENT — FIBROSIS 4 INDEX: FIB4 SCORE: 2.06

## 2022-08-24 NOTE — PROGRESS NOTES
VASCULAR MEDICINE CLINIC - INITIAL VISIT  22     Brie Olivier is a 71 y.o.  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   Quit smoking but does have recidivism.   Uses occ nicotine patch.   No myalgias on statin  BP usually <130/80 at home but hasn't checked recently  Had fatigue with higher doses of metoprolol       Past Medical History:   Diagnosis Date    AAA (abdominal aortic aneurysm) (HCC)     Hyperlipidemia     Hypertension     Pneumonia         Past Surgical History:   Procedure Laterality Date    AAA WITH STENT GRAFT      ZZZ CARDIAC CATH          Family History   Problem Relation Age of Onset    Heart Disease Neg Hx         Social History     Tobacco Use    Smoking status: Former     Packs/day: 0.25     Years: 40.00     Pack years: 10.00     Types: Cigarettes     Quit date: 3/3/2018     Years since quittin.4    Smokeless tobacco: Never    Tobacco comments:     1-2 a day    Substance Use Topics    Alcohol use: Yes     Alcohol/week: 4.2 oz     Types: 7 Glasses of wine per week     Comment: 1 per day    Drug use: No        Current Outpatient Medications on File Prior to Visit   Medication Sig Dispense Refill    NICOTINE TD Place  on the skin.      Docusate Calcium (STOOL SOFTENER PO) Take  by mouth.      Loratadine (CLARITIN PO) Take  by mouth.       No current facility-administered medications on file prior to visit.        ALLERGIES  Patient  "has no known allergies.     DIET AND EXERCISE:  Weight Change: none  Diet: relatively heart healthy  Exercise: moderate regular exercise program             Objective     Objective:     Vitals:    08/24/22 1439 08/24/22 1442   BP: 139/79 (!) 144/80   BP Location: Left arm Left arm   Patient Position: Sitting Sitting   BP Cuff Size: Adult Adult   Pulse: 67 (!) 54   Weight: 59.1 kg (130 lb 6.4 oz)    Height: 1.613 m (5' 3.5\")         Physical Exam  Vitals reviewed.   Constitutional:       General: She is not in acute distress.     Appearance: She is not diaphoretic.   HENT:      Head: Normocephalic and atraumatic.   Eyes:      General: No scleral icterus.     Conjunctiva/sclera: Conjunctivae normal.   Neck:      Vascular: No carotid bruit.   Cardiovascular:      Rate and Rhythm: Normal rate and regular rhythm.      Pulses: Normal pulses.      Heart sounds: Normal heart sounds. No murmur heard.     Comments: Weak but palpable pt and dp pulses bilat  Pulmonary:      Effort: Pulmonary effort is normal. No respiratory distress.      Breath sounds: Normal breath sounds. No wheezing or rales.   Musculoskeletal:      Right lower leg: No edema.      Left lower leg: No edema.   Skin:     Coloration: Skin is not pale.   Neurological:      General: No focal deficit present.      Mental Status: She is alert and oriented to person, place, and time.      Cranial Nerves: No cranial nerve deficit.      Coordination: Coordination normal.      Gait: Gait is intact. Gait normal.   Psychiatric:         Mood and Affect: Mood and affect normal.         Behavior: Behavior normal.        DATA REVIEW    Lab Results   Component Value Date/Time    CHOLSTRLTOT 172 11/16/2021 01:17 PM    LDL 88 11/16/2021 01:17 PM    HDL 72 11/16/2021 01:17 PM    TRIGLYCERIDE 62 11/16/2021 01:17 PM       Lab Results   Component Value Date/Time    SODIUM 140 11/16/2021 01:17 PM    POTASSIUM 4.3 11/16/2021 01:17 PM    CHLORIDE 107 11/16/2021 01:17 PM    CO2 23 " 11/16/2021 01:17 PM    GLUCOSE 89 11/16/2021 01:17 PM    BUN 20 11/16/2021 01:17 PM    CREATININE 0.81 11/16/2021 01:17 PM     Lab Results   Component Value Date/Time    ALKPHOSPHAT 72 11/16/2021 01:17 PM    ASTSGOT 20 11/16/2021 01:17 PM    ALTSGPT 16 11/16/2021 01:17 PM    TBILIRUBIN 0.5 11/16/2021 01:17 PM       Lab Results   Component Value Date/Time    HBA1C 5.2 11/16/2021 01:17 PM       Lab Results   Component Value Date/Time    MALBCRT 32 (H) 11/16/2021 01:17 PM    MICROALBUR 1.8 11/16/2021 01:17 PM         CTA thoracoabdominal sep 2020  1.  Interval enlargement of the ascending and descending thoracic aorta as described. New soft plaque/thrombus in the posterior descending thoracic aorta. Postoperative changes from prior surgical repair are unchanged.  2.  Markedly ectatic and beaded appearance of the celiac, superior mesenteric, and renal arteries. Findings are similar to the prior exam. 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  3.  COPD.  4.  Diverticulosis.    Echo March 2021  No prior study is available for comparison.   Normal left ventricular systolic function.  Left ventricular ejection fraction is visually estimated to be 65%.  Mild mitral regurgitation.  Mild tricuspid regurgitation.  Mild pulmonic insufficiency.  Ascending aorta diameter is 3.9 cm.        Medical Decision Making:  Today's Assessment / Status / Plan:     1. 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 Tobacco abuse        3. Essential hypertension, benign        4. Dyslipidemia  Comp Metabolic Panel    Lipid Profile    LIPOPROTEIN A    TSH    ezetimibe (ZETIA) 10 MG Tab    atorvastatin (LIPITOR) 80 MG tablet      5. Essential hypertension  metoprolol SR (TOPROL XL) 25 MG TABLET SR 24 HR         Patient Type:  Secondary Prevention    Etiology of Established CVD if Present:     1) thoracoabdominal aneursym s/p open repair in [place] 2018 - certainly athero and smoking contributing, but extent of presentation in a woman of her age a bit out of ordinary. Has done very well post op. Importance of AAA as a risk factor for mi/cva and need for lifelong surveillance discussed  Plan:  - check CTA thoracoabdominal  - medical management per below  - consider cta or mra of neck and head in future   - recommended screening aortic u/s for sibs and kids    Lipid Management: Qualifies for Statin Therapy Based on 2018 ACC/AHA Guidelines: yes  Calculated 10-Year Risk of ASCVD: N/A  Currently on Statin: Yes  Goal LDL <70 and nonHDL <100   Above threshold  Plan  - continue atorva 80  - add ezetimibe 10  - recheck fasting lipid panel and lp(a) prior to next visit    Blood Pressure Management:  Acc/aha (2017) Blood Pressure Goal <130/80  Seems under reasonable control at home  Bit high in office today  Did not tolerate higher doses of metoprolol  Plan:  - continue metoprolol ER 25 mg daily  - restart home bp monitoring  - consider abpm if continues to have evidence of white coat  - consider addition of ARB if home readings elevated    Glycemic Status: Normal  Recheck fasting glucose    Anti-Platelet/Anti-Coagulant Tx: yes  Start asa 81 mg daily    Smoking: CURRENT RARE smoker  Has had occ residivism  - complete cessation from cigarettes  - can use nicotine gum or lozenge    Physical Activity: continue reasonable exercise routine    Weight Management and Nutrition: continue heart healthy diet      Instructed to follow-up with PCP for remainder of adult medical needs: yes  We will partner with other providers in the management of established vascular disease and cardiometabolic risk factors.    Studies to Be Obtained: CTA thoracoabdominal  Labs to Be Obtained: as above prior to next visit    Follow up in: 3 months    Total time: 60-74min - chart  review/prep, review of other providers' records, imaging/lab review, face-to-face time for history/examination, ordering, prescribing,  review of results/meds/ treatment plan with patient/family/caregiver, documentation in EMR, care coordination (as needed)     Michael J Bloch, M.D.     Cc:  I Sheriff LATRELL Gonzales

## 2022-09-18 DIAGNOSIS — E78.5 DYSLIPIDEMIA: ICD-10-CM

## 2022-09-19 ENCOUNTER — HOSPITAL ENCOUNTER (OUTPATIENT)
Dept: LAB | Facility: MEDICAL CENTER | Age: 72
End: 2022-09-19
Attending: INTERNAL MEDICINE
Payer: MEDICARE

## 2022-09-19 DIAGNOSIS — E78.5 DYSLIPIDEMIA: ICD-10-CM

## 2022-09-19 DIAGNOSIS — I71.60 THORACOABDOMINAL AORTIC ANEURYSM (TAAA) WITHOUT RUPTURE (HCC): ICD-10-CM

## 2022-09-19 LAB
ALBUMIN SERPL BCP-MCNC: 4.7 G/DL (ref 3.2–4.9)
ALBUMIN/GLOB SERPL: 1.7 G/DL
ALP SERPL-CCNC: 74 U/L (ref 30–99)
ALT SERPL-CCNC: 19 U/L (ref 2–50)
ANION GAP SERPL CALC-SCNC: 8 MMOL/L (ref 7–16)
AST SERPL-CCNC: 22 U/L (ref 12–45)
BILIRUB SERPL-MCNC: 0.4 MG/DL (ref 0.1–1.5)
BUN SERPL-MCNC: 23 MG/DL (ref 8–22)
CALCIUM SERPL-MCNC: 9.6 MG/DL (ref 8.4–10.2)
CHLORIDE SERPL-SCNC: 107 MMOL/L (ref 96–112)
CHOLEST SERPL-MCNC: 165 MG/DL (ref 100–199)
CO2 SERPL-SCNC: 26 MMOL/L (ref 20–33)
CREAT SERPL-MCNC: 0.76 MG/DL (ref 0.5–1.4)
ERYTHROCYTE [DISTWIDTH] IN BLOOD BY AUTOMATED COUNT: 49.1 FL (ref 35.9–50)
FASTING STATUS PATIENT QL REPORTED: NORMAL
GFR SERPLBLD CREATININE-BSD FMLA CKD-EPI: 83 ML/MIN/1.73 M 2
GLOBULIN SER CALC-MCNC: 2.8 G/DL (ref 1.9–3.5)
GLUCOSE SERPL-MCNC: 103 MG/DL (ref 65–99)
HCT VFR BLD AUTO: 41.4 % (ref 37–47)
HDLC SERPL-MCNC: 73 MG/DL
HGB BLD-MCNC: 13.3 G/DL (ref 12–16)
LDLC SERPL CALC-MCNC: 79 MG/DL
MCH RBC QN AUTO: 32 PG (ref 27–33)
MCHC RBC AUTO-ENTMCNC: 32.1 G/DL (ref 33.6–35)
MCV RBC AUTO: 99.5 FL (ref 81.4–97.8)
PLATELET # BLD AUTO: 196 K/UL (ref 164–446)
PMV BLD AUTO: 9.5 FL (ref 9–12.9)
POTASSIUM SERPL-SCNC: 4.5 MMOL/L (ref 3.6–5.5)
PROT SERPL-MCNC: 7.5 G/DL (ref 6–8.2)
RBC # BLD AUTO: 4.16 M/UL (ref 4.2–5.4)
SODIUM SERPL-SCNC: 141 MMOL/L (ref 135–145)
TRIGL SERPL-MCNC: 64 MG/DL (ref 0–149)
TSH SERPL DL<=0.005 MIU/L-ACNC: 1.99 UIU/ML (ref 0.38–5.33)
WBC # BLD AUTO: 5.4 K/UL (ref 4.8–10.8)

## 2022-09-19 PROCEDURE — 80053 COMPREHEN METABOLIC PANEL: CPT

## 2022-09-19 PROCEDURE — 36415 COLL VENOUS BLD VENIPUNCTURE: CPT

## 2022-09-19 PROCEDURE — 80061 LIPID PANEL: CPT

## 2022-09-19 PROCEDURE — 85027 COMPLETE CBC AUTOMATED: CPT

## 2022-09-19 PROCEDURE — 84443 ASSAY THYROID STIM HORMONE: CPT

## 2022-09-19 RX ORDER — EZETIMIBE 10 MG/1
10 TABLET ORAL DAILY
Qty: 30 TABLET | Refills: 11 | Status: SHIPPED | OUTPATIENT
Start: 2022-09-19 | End: 2023-09-21

## 2022-09-20 ENCOUNTER — TELEPHONE (OUTPATIENT)
Dept: CARDIOLOGY | Facility: MEDICAL CENTER | Age: 72
End: 2022-09-20
Payer: MEDICARE

## 2022-09-20 NOTE — TELEPHONE ENCOUNTER
KUN    Called and spoke to patient to reschedule her missed appointment on 09/19/2022 , per patient she has established with a new cardiologist.

## 2022-09-23 ENCOUNTER — HOSPITAL ENCOUNTER (OUTPATIENT)
Dept: RADIOLOGY | Facility: MEDICAL CENTER | Age: 72
End: 2022-09-23
Attending: INTERNAL MEDICINE
Payer: MEDICARE

## 2022-09-23 DIAGNOSIS — I71.60 THORACOABDOMINAL AORTIC ANEURYSM (TAAA) WITHOUT RUPTURE (HCC): ICD-10-CM

## 2022-09-23 PROCEDURE — 71275 CT ANGIOGRAPHY CHEST: CPT

## 2022-09-23 PROCEDURE — 700117 HCHG RX CONTRAST REV CODE 255: Performed by: INTERNAL MEDICINE

## 2022-09-23 RX ADMIN — IOHEXOL 80 ML: 350 INJECTION, SOLUTION INTRAVENOUS at 09:53

## 2022-09-25 LAB
APOB+LDLR+PCSK9 GENE MUT ANL BLD/T: NOT DETECTED
BRCA1+BRCA2 DEL+DUP + FULL MUT ANL BLD/T: NOT DETECTED
MLH1+MSH2+MSH6+PMS2 GN DEL+DUP+FUL M: NOT DETECTED

## 2022-09-26 ENCOUNTER — DOCUMENTATION (OUTPATIENT)
Dept: VASCULAR LAB | Facility: MEDICAL CENTER | Age: 72
End: 2022-09-26
Payer: MEDICARE

## 2022-09-26 NOTE — PROGRESS NOTES
CTA reviewed  Will discuss with patient at follow-up visit  Will need continued surveillance  Repeat thoracoabdominal CTA in 1 year    Michael Bloch, MD  [facility]    Cc: LATRELL Kahn

## 2022-11-09 ENCOUNTER — PATIENT MESSAGE (OUTPATIENT)
Dept: HEALTH INFORMATION MANAGEMENT | Facility: OTHER | Age: 72
End: 2022-11-09

## 2022-12-06 ENCOUNTER — APPOINTMENT (OUTPATIENT)
Dept: VASCULAR LAB | Facility: MEDICAL CENTER | Age: 72
End: 2022-12-06
Payer: MEDICARE

## 2023-04-03 ENCOUNTER — HOSPITAL ENCOUNTER (OUTPATIENT)
Dept: LAB | Facility: MEDICAL CENTER | Age: 73
End: 2023-04-03
Attending: FAMILY MEDICINE
Payer: MEDICARE

## 2023-04-03 LAB
ALBUMIN SERPL BCP-MCNC: 4.3 G/DL (ref 3.2–4.9)
ALBUMIN/GLOB SERPL: 1.6 G/DL
ALP SERPL-CCNC: 81 U/L (ref 30–99)
ALT SERPL-CCNC: 16 U/L (ref 2–50)
ANION GAP SERPL CALC-SCNC: 9 MMOL/L (ref 7–16)
AST SERPL-CCNC: 21 U/L (ref 12–45)
BILIRUB SERPL-MCNC: 0.4 MG/DL (ref 0.1–1.5)
BUN SERPL-MCNC: 32 MG/DL (ref 8–22)
CALCIUM ALBUM COR SERPL-MCNC: 9 MG/DL (ref 8.5–10.5)
CALCIUM SERPL-MCNC: 9.2 MG/DL (ref 8.4–10.2)
CHLORIDE SERPL-SCNC: 108 MMOL/L (ref 96–112)
CHOLEST SERPL-MCNC: 135 MG/DL (ref 100–199)
CO2 SERPL-SCNC: 25 MMOL/L (ref 20–33)
CREAT SERPL-MCNC: 0.92 MG/DL (ref 0.5–1.4)
FASTING STATUS PATIENT QL REPORTED: NORMAL
GFR SERPLBLD CREATININE-BSD FMLA CKD-EPI: 66 ML/MIN/1.73 M 2
GLOBULIN SER CALC-MCNC: 2.7 G/DL (ref 1.9–3.5)
GLUCOSE SERPL-MCNC: 100 MG/DL (ref 65–99)
HDLC SERPL-MCNC: 65 MG/DL
LDLC SERPL CALC-MCNC: 59 MG/DL
POTASSIUM SERPL-SCNC: 4.8 MMOL/L (ref 3.6–5.5)
PROT SERPL-MCNC: 7 G/DL (ref 6–8.2)
SODIUM SERPL-SCNC: 142 MMOL/L (ref 135–145)
TRIGL SERPL-MCNC: 57 MG/DL (ref 0–149)

## 2023-04-03 PROCEDURE — 80053 COMPREHEN METABOLIC PANEL: CPT

## 2023-04-03 PROCEDURE — 36415 COLL VENOUS BLD VENIPUNCTURE: CPT

## 2023-04-03 PROCEDURE — 80061 LIPID PANEL: CPT

## 2023-04-19 ENCOUNTER — HOSPITAL ENCOUNTER (OUTPATIENT)
Dept: RADIOLOGY | Facility: MEDICAL CENTER | Age: 73
End: 2023-04-19
Attending: FAMILY MEDICINE
Payer: MEDICARE

## 2023-04-19 DIAGNOSIS — M85.89 DISAPPEARING BONE DISEASE: ICD-10-CM

## 2023-04-19 DIAGNOSIS — Z12.31 VISIT FOR SCREENING MAMMOGRAM: ICD-10-CM

## 2023-04-19 PROCEDURE — 77063 BREAST TOMOSYNTHESIS BI: CPT

## 2023-04-19 PROCEDURE — 77080 DXA BONE DENSITY AXIAL: CPT

## 2023-04-28 ENCOUNTER — HOSPITAL ENCOUNTER (OUTPATIENT)
Dept: RADIOLOGY | Facility: MEDICAL CENTER | Age: 73
End: 2023-04-28
Attending: FAMILY MEDICINE
Payer: MEDICARE

## 2023-04-28 DIAGNOSIS — R92.8 ABNORMAL MAMMOGRAM: ICD-10-CM

## 2023-04-28 PROCEDURE — 77065 DX MAMMO INCL CAD UNI: CPT | Mod: LT

## 2023-06-21 ENCOUNTER — OFFICE VISIT (OUTPATIENT)
Dept: VASCULAR LAB | Facility: MEDICAL CENTER | Age: 73
End: 2023-06-21
Attending: INTERNAL MEDICINE
Payer: MEDICARE

## 2023-06-21 VITALS
DIASTOLIC BLOOD PRESSURE: 70 MMHG | HEIGHT: 63 IN | BODY MASS INDEX: 23.57 KG/M2 | HEART RATE: 59 BPM | SYSTOLIC BLOOD PRESSURE: 117 MMHG | WEIGHT: 133 LBS

## 2023-06-21 DIAGNOSIS — E78.5 DYSLIPIDEMIA: ICD-10-CM

## 2023-06-21 DIAGNOSIS — I71.60 THORACOABDOMINAL AORTIC ANEURYSM (TAAA) WITHOUT RUPTURE, UNSPECIFIED PART (HCC): ICD-10-CM

## 2023-06-21 DIAGNOSIS — I10 ESSENTIAL HYPERTENSION, BENIGN: ICD-10-CM

## 2023-06-21 PROCEDURE — 3078F DIAST BP <80 MM HG: CPT | Performed by: INTERNAL MEDICINE

## 2023-06-21 PROCEDURE — 99214 OFFICE O/P EST MOD 30 MIN: CPT | Performed by: INTERNAL MEDICINE

## 2023-06-21 PROCEDURE — 99212 OFFICE O/P EST SF 10 MIN: CPT

## 2023-06-21 PROCEDURE — 3074F SYST BP LT 130 MM HG: CPT | Performed by: INTERNAL MEDICINE

## 2023-06-21 RX ORDER — CYANOCOBALAMIN (VITAMIN B-12) 1000 MCG
1000 TABLET ORAL
COMMUNITY

## 2023-06-21 ASSESSMENT — FIBROSIS 4 INDEX: FIB4 SCORE: 1.928571428571428571

## 2023-06-21 NOTE — PROGRESS NOTES
"VASCULAR MEDICINE CLINIC - Follow up VISIT  23    Brie Olivier is a 72 y.o.  female who presents today  for   Chief Complaint   Patient presents with    Follow-Up      Subjective    HPI:  Here for f/u of thoracoabdominal aneurysm in setting of htn and dyslipidemia  Patient a bit overdue for follow-up -had to reschedule in the past  Generally doing well  No recurrent vascular interventions  Had CTA last year  No tia or cva symptoms  Doesn't know if she has had carotid or intracranial imaging in the past  Denies angina  No claudicaiton  No post priandial abd pain.   Quit smoking but does have occasional recidivism.   No myalgias on statin plus ezetimibe-good adherence  BP usually <130/80 at home but hasn't checked recently  Had fatigue with higher doses of metoprolol     Social History     Tobacco Use    Smoking status: Former     Packs/day: 0.25     Years: 40.00     Pack years: 10.00     Types: Cigarettes     Quit date: 3/3/2018     Years since quittin.3    Smokeless tobacco: Never    Tobacco comments:     1-2 a day    Substance Use Topics    Alcohol use: Yes     Alcohol/week: 4.2 oz     Types: 7 Glasses of wine per week     Comment: 1 per day    Drug use: No      DIET AND EXERCISE:  Weight Change: none  Diet: relatively heart healthy  Exercise: moderate regular exercise program             Objective     Objective:     Vitals:    23 1344   BP: 117/70   BP Location: Left arm   Patient Position: Sitting   BP Cuff Size: Adult   Pulse: (!) 59   Weight: 60.3 kg (133 lb)   Height: 1.6 m (5' 3\")        Physical Exam  Vitals reviewed.   Constitutional:       General: She is not in acute distress.     Appearance: She is not diaphoretic.   HENT:      Head: Normocephalic and atraumatic.   Eyes:      General: No scleral icterus.     Conjunctiva/sclera: Conjunctivae normal.   Neck:      Vascular: No carotid bruit.   Cardiovascular:      Rate and Rhythm: Normal rate and regular rhythm.      Pulses: " Normal pulses.      Heart sounds: Normal heart sounds. No murmur heard.     Comments: Weak but palpable pt and dp pulses bilat  Pulmonary:      Effort: Pulmonary effort is normal. No respiratory distress.      Breath sounds: Normal breath sounds. No wheezing or rales.   Musculoskeletal:      Right lower leg: No edema.      Left lower leg: No edema.   Skin:     Coloration: Skin is not pale.   Neurological:      General: No focal deficit present.      Mental Status: She is alert and oriented to person, place, and time.      Cranial Nerves: No cranial nerve deficit.      Coordination: Coordination normal.      Gait: Gait is intact. Gait normal.   Psychiatric:         Mood and Affect: Mood and affect normal.         Behavior: Behavior normal.          DATA REVIEW    Lab Results   Component Value Date/Time    CHOLSTRLTOT 135 04/03/2023 09:01 AM    LDL 59 04/03/2023 09:01 AM    HDL 65 04/03/2023 09:01 AM    TRIGLYCERIDE 57 04/03/2023 09:01 AM       Lab Results   Component Value Date/Time    SODIUM 142 04/03/2023 09:01 AM    POTASSIUM 4.8 04/03/2023 09:01 AM    CHLORIDE 108 04/03/2023 09:01 AM    CO2 25 04/03/2023 09:01 AM    GLUCOSE 100 (H) 04/03/2023 09:01 AM    BUN 32 (H) 04/03/2023 09:01 AM    CREATININE 0.92 04/03/2023 09:01 AM     Lab Results   Component Value Date/Time    ALKPHOSPHAT 81 04/03/2023 09:01 AM    ASTSGOT 21 04/03/2023 09:01 AM    ALTSGPT 16 04/03/2023 09:01 AM    TBILIRUBIN 0.4 04/03/2023 09:01 AM       Lab Results   Component Value Date/Time    HBA1C 5.2 11/16/2021 01:17 PM       Lab Results   Component Value Date/Time    MALBCRT 32 (H) 11/16/2021 01:17 PM    MICROALBUR 1.8 11/16/2021 01:17 PM         CTA thoracoabdominal sep 2020  1.  Interval enlargement of the ascending and descending thoracic aorta as described. New soft plaque/thrombus in the posterior descending thoracic aorta. Postoperative changes from prior surgical repair are unchanged.  2.  Markedly ectatic and beaded appearance of the  celiac, superior mesenteric, and renal arteries. Findings are similar to the prior exam. 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  3.  COPD.  4.  Diverticulosis.    Echo March 2021  No prior study is available for comparison.   Normal left ventricular systolic function.  Left ventricular ejection fraction is visually estimated to be 65%.  Mild mitral regurgitation.  Mild tricuspid regurgitation.  Mild pulmonic insufficiency.  Ascending aorta diameter is 3.9 cm.    CTA thoracicoabdominal September 2022  1.  Borderline dilated ascending aorta and dilated descending thoracic aorta unchanged compared to previous.  2.  Ectatic and beaded celiac, superior mesenteric, and renal arteries similar to previous.  3.  No new aneurysm identified. No acute dissection.  4.  Tiny nodules within lungs measuring up to 3.7 mm unchanged.  5.  Cholelithiasis and dilated common bile duct. Ultrasound follow-up is a consideration.  6.  Diverticula colon. No free fluid.        Medical Decision Making:  Today's Assessment / Status / Plan:     1. Dyslipidemia        2. Thoracoabdominal aortic aneurysm (TAAA) without rupture, unspecified part (HCC)        3. Essential hypertension, benign           Patient Type: Secondary Prevention    Etiology of Established CVD if Present:     1) thoracoabdominal aneursym s/p open repair in [place] 2018 - certainly athero and smoking contributing, but extent of presentation in a woman of her age a bit out of ordinary.  Possibly consistent with FMD based on CTA.  No obvious symptoms of vasculitis.  Want to rule out concomitant carotid or intracranial aneurysm.  Has done very well post op. Importance of AAA as a risk factor for mi/cva and need for lifelong surveillance discussed  Plan:  - recheck CTA thoracoabdominal 1 year from previous (September 2023)  -Check MRA head and neck  - medical management per below  - recommended screening aortic u/s for sibs and kids  -Patient declined genetic  testing for aortopathy at this point    Lipid Management: Qualifies for Statin Therapy Based on 2018 ACC/AHA Guidelines: yes  Calculated 10-Year Risk of ASCVD: N/A  Currently on Statin: Yes  Goal LDL <70 and nonHDL <100   Good control after addition of ezetimibe  Plan  - continue atorva 80  -Continue ezetimibe 10  - recheck fasting lipid panel in 6 to 12 months    Blood Pressure Management:  Acc/aha (2017) Blood Pressure Goal <130/80  Seems under reasonable control at home  Reasonable control in office today  Did not tolerate higher doses of metoprolol  Plan:  - continue metoprolol ER 25 mg daily  - restart home bp monitoring  - consider abpm if  further evidence of white coat  - consider addition of ARB if home readings elevated    Glycemic Status: Prediabetes  Most recent blood work consistent with mild IFG  -Continue lifestyle modification  -Follow fasting sugars over time    Anti-Platelet/Anti-Coagulant Tx: yes  -Continue asa 81 mg daily    Smoking: CURRENT RARE smoker  Has had occ residivism  - complete cessation from cigarettes  - can use nicotine gum or lozenge    Physical Activity: continue reasonable exercise routine    Weight Management and Nutrition: continue heart healthy diet and maintenance of weight      Instructed to follow-up with PCP for remainder of adult medical needs: yes  We will partner with other providers in the management of established vascular disease and cardiometabolic risk factors.    Studies to Be Obtained:   1) MRA head and neck this summer-ordered today   2) CTA CTA thoracoabdominal September 2024-ordered today    Labs to Be Obtained: none    Follow up in: Late September after imaging    Michael J Bloch, M.D.     Cc:  PANCHO Grey

## 2023-08-25 DIAGNOSIS — I10 ESSENTIAL HYPERTENSION, BENIGN: ICD-10-CM

## 2023-08-28 ENCOUNTER — HOSPITAL ENCOUNTER (OUTPATIENT)
Dept: RADIOLOGY | Facility: MEDICAL CENTER | Age: 73
End: 2023-08-28
Attending: INTERNAL MEDICINE
Payer: MEDICARE

## 2023-08-28 DIAGNOSIS — I71.60 THORACOABDOMINAL AORTIC ANEURYSM (TAAA) WITHOUT RUPTURE, UNSPECIFIED PART (HCC): ICD-10-CM

## 2023-08-28 PROCEDURE — 70547 MR ANGIOGRAPHY NECK W/O DYE: CPT

## 2023-08-28 PROCEDURE — 70544 MR ANGIOGRAPHY HEAD W/O DYE: CPT

## 2023-09-07 ENCOUNTER — APPOINTMENT (OUTPATIENT)
Dept: RADIOLOGY | Facility: MEDICAL CENTER | Age: 73
End: 2023-09-07
Attending: INTERNAL MEDICINE
Payer: MEDICARE

## 2023-09-21 DIAGNOSIS — E78.5 DYSLIPIDEMIA: ICD-10-CM

## 2023-09-21 RX ORDER — EZETIMIBE 10 MG/1
10 TABLET ORAL DAILY
Qty: 90 TABLET | Refills: 3 | Status: SHIPPED | OUTPATIENT
Start: 2023-09-21

## 2023-09-22 ENCOUNTER — TELEPHONE (OUTPATIENT)
Dept: PHARMACY | Facility: MEDICAL CENTER | Age: 73
End: 2023-09-22
Payer: COMMERCIAL

## 2023-09-22 NOTE — TELEPHONE ENCOUNTER
Received Refill PA request via [facility]  for Zetia 10mg tablets.     (Quantity:90, Day Supply:90)-$8.77  (Quantity:30, Day Supply:30)-$3.64     Insurance: [employer]  Member ID:[id number]  BIN: [id number]  PCN: [id number]  Group: [id number]     Ran Test claim via [place]      Pharmacy on File  [id number]   [address] [place] [address]   Phone:  [phone]    Fax:  [phone]

## 2023-09-22 NOTE — TELEPHONE ENCOUNTER
Called and spoke to Pt @ [phone] regarding with Ezetimibe medication. Pt declined to fill with [facility], and would like to send out the medication to [id number] - [place], [place] - [address]   [address] [place] [address]     KYE Hurst, PhT  Pharmacy Liaison  P: [phone]  9/22/2023 2:54 PM

## 2023-09-27 ENCOUNTER — APPOINTMENT (OUTPATIENT)
Dept: VASCULAR LAB | Facility: MEDICAL CENTER | Age: 73
End: 2023-09-27
Attending: INTERNAL MEDICINE
Payer: MEDICARE

## 2023-10-03 DIAGNOSIS — I10 ESSENTIAL HYPERTENSION: ICD-10-CM

## 2023-10-03 RX ORDER — METOPROLOL SUCCINATE 25 MG/1
25 TABLET, EXTENDED RELEASE ORAL
Qty: 90 TABLET | Refills: 3 | Status: SHIPPED | OUTPATIENT
Start: 2023-10-03

## 2023-10-18 ENCOUNTER — HOSPITAL ENCOUNTER (OUTPATIENT)
Dept: LAB | Facility: MEDICAL CENTER | Age: 73
End: 2023-10-18
Attending: INTERNAL MEDICINE
Payer: MEDICARE

## 2023-10-18 LAB
ALBUMIN SERPL BCP-MCNC: 4.2 G/DL (ref 3.2–4.9)
ALBUMIN/GLOB SERPL: 1.4 G/DL
ALP SERPL-CCNC: 72 U/L (ref 30–99)
ALT SERPL-CCNC: 20 U/L (ref 2–50)
ANION GAP SERPL CALC-SCNC: 11 MMOL/L (ref 7–16)
AST SERPL-CCNC: 24 U/L (ref 12–45)
BASOPHILS # BLD AUTO: 1.3 % (ref 0–1.8)
BASOPHILS # BLD: 0.06 K/UL (ref 0–0.12)
BILIRUB SERPL-MCNC: 0.5 MG/DL (ref 0.1–1.5)
BUN SERPL-MCNC: 19 MG/DL (ref 8–22)
CALCIUM ALBUM COR SERPL-MCNC: 9 MG/DL (ref 8.5–10.5)
CALCIUM SERPL-MCNC: 9.2 MG/DL (ref 8.4–10.2)
CHLORIDE SERPL-SCNC: 104 MMOL/L (ref 96–112)
CHOLEST SERPL-MCNC: 135 MG/DL (ref 100–199)
CO2 SERPL-SCNC: 24 MMOL/L (ref 20–33)
CREAT SERPL-MCNC: 0.78 MG/DL (ref 0.5–1.4)
EOSINOPHIL # BLD AUTO: 0.15 K/UL (ref 0–0.51)
EOSINOPHIL NFR BLD: 3.3 % (ref 0–6.9)
ERYTHROCYTE [DISTWIDTH] IN BLOOD BY AUTOMATED COUNT: 48.5 FL (ref 35.9–50)
FASTING STATUS PATIENT QL REPORTED: NORMAL
GFR SERPLBLD CREATININE-BSD FMLA CKD-EPI: 80 ML/MIN/1.73 M 2
GLOBULIN SER CALC-MCNC: 2.9 G/DL (ref 1.9–3.5)
GLUCOSE SERPL-MCNC: 89 MG/DL (ref 65–99)
HCT VFR BLD AUTO: 40.9 % (ref 37–47)
HDLC SERPL-MCNC: 63 MG/DL
HGB BLD-MCNC: 13.2 G/DL (ref 12–16)
IMM GRANULOCYTES # BLD AUTO: 0.01 K/UL (ref 0–0.11)
IMM GRANULOCYTES NFR BLD AUTO: 0.2 % (ref 0–0.9)
LDLC SERPL CALC-MCNC: 60 MG/DL
LYMPHOCYTES # BLD AUTO: 1.7 K/UL (ref 1–4.8)
LYMPHOCYTES NFR BLD: 37.7 % (ref 22–41)
MCH RBC QN AUTO: 31.7 PG (ref 27–33)
MCHC RBC AUTO-ENTMCNC: 32.3 G/DL (ref 32.2–35.5)
MCV RBC AUTO: 98.1 FL (ref 81.4–97.8)
MONOCYTES # BLD AUTO: 0.38 K/UL (ref 0–0.85)
MONOCYTES NFR BLD AUTO: 8.4 % (ref 0–13.4)
NEUTROPHILS # BLD AUTO: 2.21 K/UL (ref 1.82–7.42)
NEUTROPHILS NFR BLD: 49.1 % (ref 44–72)
NRBC # BLD AUTO: 0 K/UL
NRBC BLD-RTO: 0 /100 WBC (ref 0–0.2)
PLATELET # BLD AUTO: 170 K/UL (ref 164–446)
PMV BLD AUTO: 9.1 FL (ref 9–12.9)
POTASSIUM SERPL-SCNC: 4.6 MMOL/L (ref 3.6–5.5)
PROT SERPL-MCNC: 7.1 G/DL (ref 6–8.2)
RBC # BLD AUTO: 4.17 M/UL (ref 4.2–5.4)
SODIUM SERPL-SCNC: 139 MMOL/L (ref 135–145)
TRIGL SERPL-MCNC: 61 MG/DL (ref 0–149)
WBC # BLD AUTO: 4.5 K/UL (ref 4.8–10.8)

## 2023-10-18 PROCEDURE — 85025 COMPLETE CBC W/AUTO DIFF WBC: CPT

## 2023-10-18 PROCEDURE — 83036 HEMOGLOBIN GLYCOSYLATED A1C: CPT

## 2023-10-18 PROCEDURE — 36415 COLL VENOUS BLD VENIPUNCTURE: CPT

## 2023-10-18 PROCEDURE — 80053 COMPREHEN METABOLIC PANEL: CPT

## 2023-10-18 PROCEDURE — 80061 LIPID PANEL: CPT

## 2023-10-19 ENCOUNTER — HOSPITAL ENCOUNTER (OUTPATIENT)
Facility: MEDICAL CENTER | Age: 73
End: 2023-10-19
Attending: FAMILY MEDICINE
Payer: MEDICARE

## 2023-10-19 LAB
CREAT UR-MCNC: 95.32 MG/DL
EST. AVERAGE GLUCOSE BLD GHB EST-MCNC: 114 MG/DL
HBA1C MFR BLD: 5.6 % (ref 4–5.6)
MICROALBUMIN UR-MCNC: <1.2 MG/DL
MICROALBUMIN/CREAT UR: NORMAL MG/G (ref 0–30)

## 2023-10-19 PROCEDURE — 82570 ASSAY OF URINE CREATININE: CPT

## 2023-10-19 PROCEDURE — 82043 UR ALBUMIN QUANTITATIVE: CPT

## 2023-10-26 ENCOUNTER — OFFICE VISIT (OUTPATIENT)
Dept: VASCULAR LAB | Facility: MEDICAL CENTER | Age: 73
End: 2023-10-26
Payer: MEDICARE

## 2023-10-26 VITALS
HEIGHT: 64 IN | WEIGHT: 135 LBS | HEART RATE: 56 BPM | BODY MASS INDEX: 23.05 KG/M2 | SYSTOLIC BLOOD PRESSURE: 145 MMHG | DIASTOLIC BLOOD PRESSURE: 76 MMHG

## 2023-10-26 DIAGNOSIS — Z72.0 TOBACCO ABUSE: ICD-10-CM

## 2023-10-26 DIAGNOSIS — E78.5 DYSLIPIDEMIA: ICD-10-CM

## 2023-10-26 DIAGNOSIS — I71.60 THORACOABDOMINAL AORTIC ANEURYSM (TAAA) WITHOUT RUPTURE, UNSPECIFIED PART (HCC): ICD-10-CM

## 2023-10-26 DIAGNOSIS — I10 ESSENTIAL HYPERTENSION, BENIGN: ICD-10-CM

## 2023-10-26 PROCEDURE — 3077F SYST BP >= 140 MM HG: CPT

## 2023-10-26 PROCEDURE — 3078F DIAST BP <80 MM HG: CPT

## 2023-10-26 PROCEDURE — 99406 BEHAV CHNG SMOKING 3-10 MIN: CPT

## 2023-10-26 PROCEDURE — 99212 OFFICE O/P EST SF 10 MIN: CPT | Mod: 25

## 2023-10-26 PROCEDURE — 99214 OFFICE O/P EST MOD 30 MIN: CPT

## 2023-10-26 ASSESSMENT — FIBROSIS 4 INDEX: FIB4 SCORE: 2.3

## 2023-10-26 NOTE — PROGRESS NOTES
"[facility] - Follow up VISIT  10/26/2023    Brie Olivier is a 72 y.o.  female who presents today  for   Chief Complaint   Patient presents with    Follow-Up      Subjective    HPI:  Here for f/u of thoracoabdominal aneurysm in setting of htn and dyslipidemia  Doing well  Had imaging- reviewed  No tia or cva symptoms  Denies angina  No claudicaiton  No post priandial abd pain.   Occasional back pain, but relates to musculoskeletal pain,   Still occasionally smoking, maybe 1-2x'/month, recently picked up lozenges to help and is using patches  No myalgias on statin plus ezetimibe-good adherence  Not checking home bps  Denies cp, sob, palpitations, or leg swelling  Had fatigue with higher doses of metoprolol  Working out 3-4x's/week  Had fasting labs     Social History     Tobacco Use    Smoking status: Former     Current packs/day: 0.00     Average packs/day: 0.3 packs/day for 40.0 years (10.0 ttl pk-yrs)     Types: Cigarettes     Start date: 3/3/1978     Quit date: 3/3/2018     Years since quittin.6    Smokeless tobacco: Never    Tobacco comments:     1-2 a day    Substance Use Topics    Alcohol use: Yes     Alcohol/week: 4.2 oz     Types: 7 Glasses of wine per week     Comment: 1 per day    Drug use: No      DIET AND EXERCISE:  Weight Change: none  Diet: relatively heart healthy  Exercise: moderate regular exercise program             Objective     Objective:     Vitals:    10/26/23 0845 10/26/23 0849 10/26/23 0918   BP: (!) 146/71 (!) 148/81 (!) 145/76   BP Location: Left arm Left arm    Patient Position: Sitting Sitting    BP Cuff Size: Adult Adult    Pulse: (!) 57 (!) 56    Weight: 61.2 kg (135 lb)     Height: 1.613 m (5' 3.5\")          Physical Exam  Vitals reviewed.   Constitutional:       General: She is not in acute distress.     Appearance: She is not diaphoretic.   HENT:      Head: Normocephalic and atraumatic.   Eyes:      General: No scleral icterus.     Conjunctiva/sclera: " Conjunctivae normal.   Neck:      Vascular: No carotid bruit.   Cardiovascular:      Rate and Rhythm: Normal rate and regular rhythm.      Pulses: Normal pulses.      Heart sounds: Normal heart sounds. No murmur heard.     Comments: Weak but palpable pt and dp pulses bilat  Pulmonary:      Effort: Pulmonary effort is normal. No respiratory distress.      Breath sounds: Normal breath sounds. No wheezing or rales.   Musculoskeletal:      Right lower leg: No edema.      Left lower leg: No edema.   Skin:     Coloration: Skin is not pale.   Neurological:      General: No focal deficit present.      Mental Status: She is alert and oriented to person, place, and time.      Cranial Nerves: No cranial nerve deficit.      Coordination: Coordination normal.      Gait: Gait is intact. Gait normal.   Psychiatric:         Mood and Affect: Mood and affect normal.         Behavior: Behavior normal.          DATA REVIEW    Lab Results   Component Value Date/Time    CHOLSTRLTOT 135 10/18/2023 12:29 PM    LDL 60 10/18/2023 12:29 PM    HDL 63 10/18/2023 12:29 PM    TRIGLYCERIDE 61 10/18/2023 12:29 PM       Lab Results   Component Value Date/Time    SODIUM 139 10/18/2023 12:29 PM    POTASSIUM 4.6 10/18/2023 12:29 PM    CHLORIDE 104 10/18/2023 12:29 PM    CO2 24 10/18/2023 12:29 PM    GLUCOSE 89 10/18/2023 12:29 PM    BUN 19 10/18/2023 12:29 PM    CREATININE 0.78 10/18/2023 12:29 PM     Lab Results   Component Value Date/Time    ALKPHOSPHAT 72 10/18/2023 12:29 PM    ASTSGOT 24 10/18/2023 12:29 PM    ALTSGPT 20 10/18/2023 12:29 PM    TBILIRUBIN 0.5 10/18/2023 12:29 PM       Lab Results   Component Value Date/Time    HBA1C 5.6 10/18/2023 12:29 PM       Lab Results   Component Value Date/Time    MALBCRT see below 10/19/2023 07:30 AM    MICROALBUR <1.2 10/19/2023 07:30 AM         CTA thoracoabdominal sep 2020  1.  Interval enlargement of the ascending and descending thoracic aorta as described. New soft plaque/thrombus in the posterior  descending thoracic aorta. Postoperative changes from prior surgical repair are unchanged.  2.  Markedly ectatic and beaded appearance of the celiac, superior mesenteric, and renal arteries. Findings are similar to the prior exam. 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  3.  COPD.  4.  Diverticulosis.    Echo March 2021  No prior study is available for comparison.   Normal left ventricular systolic function.  Left ventricular ejection fraction is visually estimated to be 65%.  Mild mitral regurgitation.  Mild tricuspid regurgitation.  Mild pulmonic insufficiency.  Ascending aorta diameter is 3.9 cm.    CTA thoracicoabdominal September 2022  1.  Borderline dilated ascending aorta and dilated descending thoracic aorta unchanged compared to previous.  2.  Ectatic and beaded celiac, superior mesenteric, and renal arteries similar to previous.  3.  No new aneurysm identified. No acute dissection.  4.  Tiny nodules within lungs measuring up to 3.7 mm unchanged.  5.  Cholelithiasis and dilated common bile duct. Ultrasound follow-up is a consideration.  6.  Diverticula colon. No free fluid.    MRA head and neck 8/2023  Age-related volume loss and chronic microvascular ischemic changes.  Normal intracranial MRA  Normal MRA of the neck.        Medical Decision Making:  Today's Assessment / Status / Plan:     1. Dyslipidemia        2. Essential hypertension, benign        3. Thoracoabdominal aortic aneurysm (TAAA) without rupture, unspecified part (HCC)        4. Tobacco abuse           Patient Type: Secondary Prevention    Etiology of Established CVD if Present:     1) thoracoabdominal aneursym s/p open repair in [place] 2018 - certainly athero and smoking contributing, but extent of presentation in a woman of her age a bit out of ordinary.  Possibly consistent with FMD based on CTA.  No obvious symptoms of vasculitis.  Want to rule out concomitant carotid or intracranial aneurysm.  Has done very well post  op. Importance of AAA as a risk factor for mi/cva and need for lifelong surveillance discussed.  MRA head and neck normal 8/2023.  Plan:  - recheck CTA thoracoabdominal 1 year from previous (September 2023) - not done, gave scheduling number to call  - medical management per below  - recommended screening aortic u/s for sibs and kids  -Patient declined genetic testing for aortopathy at this point    Lipid Management: Qualifies for Statin Therapy Based on 2018 ACC/AHA Guidelines: yes  Calculated 10-Year Risk of ASCVD: N/A  Currently on Statin: Yes  Goal LDL <70 and nonHDL <100   Good control after addition of ezetimibe  At goal on most recent imaging 10/2023  Plan  - continue atorva 80  -Continue ezetimibe 10  - recheck fasting lipid panel in 6 to 12 months - order at next appt    Blood Pressure Management:  [facility] (2017) Blood Pressure Goal <130/80  Unclear control at home, not checking  Elevated in office today  Did not tolerate higher doses of metoprolol  Plan:  - continue metoprolol ER 25 mg daily  - restart home bp monitoring- reviewed and given log  - consider abpm if  further evidence of white coat  - consider addition of ARB if home readings elevated    Glycemic Status: Prediabetes  Most recent blood work consistent with mild IFG, a1c 5.6 10/2023  -Continue lifestyle modification- reviewed reduce/limit carbs and sugars  -Follow fasting sugars over time    Anti-Platelet/Anti-Coagulant Tx: yes  -Continue asa 81 mg daily    Smoking: CURRENT RARE smoker, 1-2x's/month  Has had occ residivism  Using patches and will start with lozenges   Reviewed importance of complete cessation  - complete cessation from cigarettes  - can use nicotine gum or lozenge  -continue to address at every visit  -3 minutes of today visit was spent discussing smoking and cessation    Physical Activity: continue reasonable exercise routine    Weight Management and Nutrition: continue heart healthy diet and maintenance of  weight      Instructed to follow-up with PCP for remainder of adult medical needs: yes  We will partner with other providers in the management of established vascular disease and cardiometabolic risk factors.    Studies to Be Obtained:   1) CTA CTA thoracoabdominal September 2024- ordered - given scheduling number, pt to call and schedule    Labs to Be Obtained: none, order at next appt    Follow up in: 4 weeks to review BP log    Maddy CUADRA  [facility]      Cc:  PANCHO Grey

## 2023-11-28 ENCOUNTER — APPOINTMENT (OUTPATIENT)
Dept: VASCULAR LAB | Facility: MEDICAL CENTER | Age: 73
End: 2023-11-28
Payer: MEDICARE

## 2023-11-28 ENCOUNTER — DOCUMENTATION (OUTPATIENT)
Dept: VASCULAR LAB | Facility: MEDICAL CENTER | Age: 73
End: 2023-11-28
Payer: MEDICARE

## 2023-11-28 NOTE — PROGRESS NOTES
Called and left a message to reschedule appt with Vascular APRN.    Daniela Greenfield, Med Ass't  [facility]  Ph. [phone]  Fx. [phone]

## 2024-05-03 ENCOUNTER — HOSPITAL ENCOUNTER (OUTPATIENT)
Dept: LAB | Facility: MEDICAL CENTER | Age: 74
End: 2024-05-03
Attending: FAMILY MEDICINE
Payer: MEDICARE

## 2024-05-03 LAB
ALBUMIN SERPL BCP-MCNC: 4.1 G/DL (ref 3.2–4.9)
ALBUMIN/GLOB SERPL: 1.5 G/DL
ALP SERPL-CCNC: 69 U/L (ref 30–99)
ALT SERPL-CCNC: 17 U/L (ref 2–50)
ANION GAP SERPL CALC-SCNC: 10 MMOL/L (ref 7–16)
AST SERPL-CCNC: 24 U/L (ref 12–45)
BASOPHILS # BLD AUTO: 0.8 % (ref 0–1.8)
BASOPHILS # BLD: 0.04 K/UL (ref 0–0.12)
BILIRUB SERPL-MCNC: 0.4 MG/DL (ref 0.1–1.5)
BUN SERPL-MCNC: 24 MG/DL (ref 8–22)
CALCIUM ALBUM COR SERPL-MCNC: 9.3 MG/DL (ref 8.5–10.5)
CALCIUM SERPL-MCNC: 9.4 MG/DL (ref 8.4–10.2)
CHLORIDE SERPL-SCNC: 108 MMOL/L (ref 96–112)
CHOLEST SERPL-MCNC: 126 MG/DL (ref 100–199)
CO2 SERPL-SCNC: 24 MMOL/L (ref 20–33)
CREAT SERPL-MCNC: 0.84 MG/DL (ref 0.5–1.4)
EOSINOPHIL # BLD AUTO: 0.24 K/UL (ref 0–0.51)
EOSINOPHIL NFR BLD: 4.8 % (ref 0–6.9)
ERYTHROCYTE [DISTWIDTH] IN BLOOD BY AUTOMATED COUNT: 48.7 FL (ref 35.9–50)
EST. AVERAGE GLUCOSE BLD GHB EST-MCNC: 108 MG/DL
FASTING STATUS PATIENT QL REPORTED: NORMAL
GFR SERPLBLD CREATININE-BSD FMLA CKD-EPI: 73 ML/MIN/1.73 M 2
GLOBULIN SER CALC-MCNC: 2.8 G/DL (ref 1.9–3.5)
GLUCOSE SERPL-MCNC: 100 MG/DL (ref 65–99)
HBA1C MFR BLD: 5.4 % (ref 4–5.6)
HCT VFR BLD AUTO: 39.2 % (ref 37–47)
HDLC SERPL-MCNC: 61 MG/DL
HGB BLD-MCNC: 12.6 G/DL (ref 12–16)
IMM GRANULOCYTES # BLD AUTO: 0.01 K/UL (ref 0–0.11)
IMM GRANULOCYTES NFR BLD AUTO: 0.2 % (ref 0–0.9)
LDLC SERPL CALC-MCNC: 51 MG/DL
LYMPHOCYTES # BLD AUTO: 1.5 K/UL (ref 1–4.8)
LYMPHOCYTES NFR BLD: 30.1 % (ref 22–41)
MCH RBC QN AUTO: 31.5 PG (ref 27–33)
MCHC RBC AUTO-ENTMCNC: 32.1 G/DL (ref 32.2–35.5)
MCV RBC AUTO: 98 FL (ref 81.4–97.8)
MONOCYTES # BLD AUTO: 0.35 K/UL (ref 0–0.85)
MONOCYTES NFR BLD AUTO: 7 % (ref 0–13.4)
NEUTROPHILS # BLD AUTO: 2.84 K/UL (ref 1.82–7.42)
NEUTROPHILS NFR BLD: 57.1 % (ref 44–72)
NRBC # BLD AUTO: 0 K/UL
NRBC BLD-RTO: 0 /100 WBC (ref 0–0.2)
PLATELET # BLD AUTO: 170 K/UL (ref 164–446)
PMV BLD AUTO: 9.5 FL (ref 9–12.9)
POTASSIUM SERPL-SCNC: 4.7 MMOL/L (ref 3.6–5.5)
PROT SERPL-MCNC: 6.9 G/DL (ref 6–8.2)
RBC # BLD AUTO: 4 M/UL (ref 4.2–5.4)
SODIUM SERPL-SCNC: 142 MMOL/L (ref 135–145)
TRIGL SERPL-MCNC: 69 MG/DL (ref 0–149)
WBC # BLD AUTO: 5 K/UL (ref 4.8–10.8)

## 2024-06-06 ENCOUNTER — HOSPITAL ENCOUNTER (OUTPATIENT)
Dept: RADIOLOGY | Facility: MEDICAL CENTER | Age: 74
End: 2024-06-06
Attending: FAMILY MEDICINE
Payer: MEDICARE

## 2024-06-06 DIAGNOSIS — I71.60 THORACOABDOMINAL AORTIC ANEURYSM (TAAA) WITHOUT RUPTURE, UNSPECIFIED PART (HCC): ICD-10-CM

## 2024-06-06 PROCEDURE — 700117 HCHG RX CONTRAST REV CODE 255: Performed by: FAMILY MEDICINE

## 2024-06-06 PROCEDURE — 71275 CT ANGIOGRAPHY CHEST: CPT

## 2024-06-06 RX ADMIN — IOHEXOL 100 ML: 350 INJECTION, SOLUTION INTRAVENOUS at 11:01

## 2024-09-16 DIAGNOSIS — E78.5 DYSLIPIDEMIA: ICD-10-CM

## 2024-09-16 RX ORDER — EZETIMIBE 10 MG/1
10 TABLET ORAL DAILY
Qty: 90 TABLET | Refills: 0 | Status: SHIPPED | OUTPATIENT
Start: 2024-09-16

## 2024-11-14 ENCOUNTER — HOSPITAL ENCOUNTER (OUTPATIENT)
Dept: LAB | Facility: MEDICAL CENTER | Age: 74
End: 2024-11-14
Attending: FAMILY MEDICINE
Payer: MEDICARE

## 2024-11-15 ENCOUNTER — HOSPITAL ENCOUNTER (OUTPATIENT)
Dept: LAB | Facility: MEDICAL CENTER | Age: 74
End: 2024-11-15
Attending: FAMILY MEDICINE
Payer: MEDICARE

## 2024-11-15 LAB
ALBUMIN SERPL BCP-MCNC: 4 G/DL (ref 3.2–4.9)
ALBUMIN/GLOB SERPL: 1.4 G/DL
ALP SERPL-CCNC: 67 U/L (ref 30–99)
ALT SERPL-CCNC: 23 U/L (ref 2–50)
ANION GAP SERPL CALC-SCNC: 11 MMOL/L (ref 7–16)
AST SERPL-CCNC: 25 U/L (ref 12–45)
BILIRUB SERPL-MCNC: 0.5 MG/DL (ref 0.1–1.5)
BUN SERPL-MCNC: 22 MG/DL (ref 8–22)
CALCIUM ALBUM COR SERPL-MCNC: 9.7 MG/DL (ref 8.5–10.5)
CALCIUM SERPL-MCNC: 9.7 MG/DL (ref 8.4–10.2)
CHLORIDE SERPL-SCNC: 105 MMOL/L (ref 96–112)
CHOLEST SERPL-MCNC: 132 MG/DL (ref 100–199)
CO2 SERPL-SCNC: 24 MMOL/L (ref 20–33)
CREAT SERPL-MCNC: 0.8 MG/DL (ref 0.5–1.4)
FASTING STATUS PATIENT QL REPORTED: NORMAL
GFR SERPLBLD CREATININE-BSD FMLA CKD-EPI: 77 ML/MIN/1.73 M 2
GLOBULIN SER CALC-MCNC: 2.9 G/DL (ref 1.9–3.5)
GLUCOSE SERPL-MCNC: 104 MG/DL (ref 65–99)
HDLC SERPL-MCNC: 63 MG/DL
LDLC SERPL CALC-MCNC: 55 MG/DL
POTASSIUM SERPL-SCNC: 4.6 MMOL/L (ref 3.6–5.5)
PROT SERPL-MCNC: 6.9 G/DL (ref 6–8.2)
SODIUM SERPL-SCNC: 140 MMOL/L (ref 135–145)
TRIGL SERPL-MCNC: 69 MG/DL (ref 0–149)

## 2024-11-15 PROCEDURE — 80061 LIPID PANEL: CPT

## 2024-11-15 PROCEDURE — 36415 COLL VENOUS BLD VENIPUNCTURE: CPT

## 2024-11-15 PROCEDURE — 80053 COMPREHEN METABOLIC PANEL: CPT
